# Patient Record
Sex: MALE | Race: WHITE | NOT HISPANIC OR LATINO | URBAN - METROPOLITAN AREA
[De-identification: names, ages, dates, MRNs, and addresses within clinical notes are randomized per-mention and may not be internally consistent; named-entity substitution may affect disease eponyms.]

---

## 2019-03-28 ENCOUNTER — EMERGENCY (EMERGENCY)
Facility: HOSPITAL | Age: 54
LOS: 0 days | Discharge: HOME | End: 2019-03-28
Attending: EMERGENCY MEDICINE | Admitting: EMERGENCY MEDICINE

## 2019-03-28 VITALS
RESPIRATION RATE: 18 BRPM | TEMPERATURE: 97 F | HEART RATE: 70 BPM | OXYGEN SATURATION: 98 % | DIASTOLIC BLOOD PRESSURE: 76 MMHG | SYSTOLIC BLOOD PRESSURE: 133 MMHG

## 2019-03-28 VITALS
DIASTOLIC BLOOD PRESSURE: 76 MMHG | SYSTOLIC BLOOD PRESSURE: 156 MMHG | RESPIRATION RATE: 18 BRPM | TEMPERATURE: 98 F | HEART RATE: 84 BPM | OXYGEN SATURATION: 98 %

## 2019-03-28 DIAGNOSIS — Z79.899 OTHER LONG TERM (CURRENT) DRUG THERAPY: ICD-10-CM

## 2019-03-28 DIAGNOSIS — F17.200 NICOTINE DEPENDENCE, UNSPECIFIED, UNCOMPLICATED: ICD-10-CM

## 2019-03-28 DIAGNOSIS — K57.92 DIVERTICULITIS OF INTESTINE, PART UNSPECIFIED, WITHOUT PERFORATION OR ABSCESS WITHOUT BLEEDING: ICD-10-CM

## 2019-03-28 DIAGNOSIS — R10.32 LEFT LOWER QUADRANT PAIN: ICD-10-CM

## 2019-03-28 LAB
ALBUMIN SERPL ELPH-MCNC: 4.7 G/DL — SIGNIFICANT CHANGE UP (ref 3.5–5.2)
ALP SERPL-CCNC: 55 U/L — SIGNIFICANT CHANGE UP (ref 30–115)
ALT FLD-CCNC: 20 U/L — SIGNIFICANT CHANGE UP (ref 0–41)
ANION GAP SERPL CALC-SCNC: 11 MMOL/L — SIGNIFICANT CHANGE UP (ref 7–14)
APPEARANCE UR: CLEAR — SIGNIFICANT CHANGE UP
AST SERPL-CCNC: 19 U/L — SIGNIFICANT CHANGE UP (ref 0–41)
BASOPHILS # BLD AUTO: 0.04 K/UL — SIGNIFICANT CHANGE UP (ref 0–0.2)
BASOPHILS NFR BLD AUTO: 0.4 % — SIGNIFICANT CHANGE UP (ref 0–1)
BILIRUB SERPL-MCNC: 0.8 MG/DL — SIGNIFICANT CHANGE UP (ref 0.2–1.2)
BILIRUB UR-MCNC: NEGATIVE — SIGNIFICANT CHANGE UP
BUN SERPL-MCNC: 16 MG/DL — SIGNIFICANT CHANGE UP (ref 10–20)
CALCIUM SERPL-MCNC: 9.5 MG/DL — SIGNIFICANT CHANGE UP (ref 8.5–10.1)
CHLORIDE SERPL-SCNC: 100 MMOL/L — SIGNIFICANT CHANGE UP (ref 98–110)
CO2 SERPL-SCNC: 27 MMOL/L — SIGNIFICANT CHANGE UP (ref 17–32)
COLOR SPEC: YELLOW — SIGNIFICANT CHANGE UP
CREAT SERPL-MCNC: 0.9 MG/DL — SIGNIFICANT CHANGE UP (ref 0.7–1.5)
DIFF PNL FLD: NEGATIVE — SIGNIFICANT CHANGE UP
EOSINOPHIL # BLD AUTO: 0.24 K/UL — SIGNIFICANT CHANGE UP (ref 0–0.7)
EOSINOPHIL NFR BLD AUTO: 2.3 % — SIGNIFICANT CHANGE UP (ref 0–8)
GLUCOSE SERPL-MCNC: 95 MG/DL — SIGNIFICANT CHANGE UP (ref 70–99)
GLUCOSE UR QL: NEGATIVE MG/DL — SIGNIFICANT CHANGE UP
HCT VFR BLD CALC: 47.2 % — SIGNIFICANT CHANGE UP (ref 42–52)
HGB BLD-MCNC: 15.5 G/DL — SIGNIFICANT CHANGE UP (ref 14–18)
IMM GRANULOCYTES NFR BLD AUTO: 0.5 % — HIGH (ref 0.1–0.3)
KETONES UR-MCNC: NEGATIVE — SIGNIFICANT CHANGE UP
LACTATE SERPL-SCNC: 0.7 MMOL/L — SIGNIFICANT CHANGE UP (ref 0.5–2.2)
LEUKOCYTE ESTERASE UR-ACNC: NEGATIVE — SIGNIFICANT CHANGE UP
LIDOCAIN IGE QN: 20 U/L — SIGNIFICANT CHANGE UP (ref 7–60)
LYMPHOCYTES # BLD AUTO: 2.17 K/UL — SIGNIFICANT CHANGE UP (ref 1.2–3.4)
LYMPHOCYTES # BLD AUTO: 20.9 % — SIGNIFICANT CHANGE UP (ref 20.5–51.1)
MCHC RBC-ENTMCNC: 30.4 PG — SIGNIFICANT CHANGE UP (ref 27–31)
MCHC RBC-ENTMCNC: 32.8 G/DL — SIGNIFICANT CHANGE UP (ref 32–37)
MCV RBC AUTO: 92.5 FL — SIGNIFICANT CHANGE UP (ref 80–94)
MONOCYTES # BLD AUTO: 0.74 K/UL — HIGH (ref 0.1–0.6)
MONOCYTES NFR BLD AUTO: 7.1 % — SIGNIFICANT CHANGE UP (ref 1.7–9.3)
NEUTROPHILS # BLD AUTO: 7.12 K/UL — HIGH (ref 1.4–6.5)
NEUTROPHILS NFR BLD AUTO: 68.8 % — SIGNIFICANT CHANGE UP (ref 42.2–75.2)
NITRITE UR-MCNC: NEGATIVE — SIGNIFICANT CHANGE UP
NRBC # BLD: 0 /100 WBCS — SIGNIFICANT CHANGE UP (ref 0–0)
PH UR: 6.5 — SIGNIFICANT CHANGE UP (ref 5–8)
PLATELET # BLD AUTO: 194 K/UL — SIGNIFICANT CHANGE UP (ref 130–400)
POTASSIUM SERPL-MCNC: 4.4 MMOL/L — SIGNIFICANT CHANGE UP (ref 3.5–5)
POTASSIUM SERPL-SCNC: 4.4 MMOL/L — SIGNIFICANT CHANGE UP (ref 3.5–5)
PROT SERPL-MCNC: 7.4 G/DL — SIGNIFICANT CHANGE UP (ref 6–8)
PROT UR-MCNC: NEGATIVE MG/DL — SIGNIFICANT CHANGE UP
RBC # BLD: 5.1 M/UL — SIGNIFICANT CHANGE UP (ref 4.7–6.1)
RBC # FLD: 13.3 % — SIGNIFICANT CHANGE UP (ref 11.5–14.5)
SODIUM SERPL-SCNC: 138 MMOL/L — SIGNIFICANT CHANGE UP (ref 135–146)
SP GR SPEC: 1.02 — SIGNIFICANT CHANGE UP (ref 1.01–1.03)
UROBILINOGEN FLD QL: 1 MG/DL (ref 0.2–0.2)
WBC # BLD: 10.36 K/UL — SIGNIFICANT CHANGE UP (ref 4.8–10.8)
WBC # FLD AUTO: 10.36 K/UL — SIGNIFICANT CHANGE UP (ref 4.8–10.8)

## 2019-03-28 RX ORDER — METRONIDAZOLE 500 MG
1 TABLET ORAL
Qty: 21 | Refills: 0
Start: 2019-03-28 | End: 2019-04-03

## 2019-03-28 RX ORDER — CIPROFLOXACIN LACTATE 400MG/40ML
1 VIAL (ML) INTRAVENOUS
Qty: 14 | Refills: 0
Start: 2019-03-28 | End: 2019-04-03

## 2019-03-28 RX ORDER — SODIUM CHLORIDE 9 MG/ML
3 INJECTION INTRAMUSCULAR; INTRAVENOUS; SUBCUTANEOUS ONCE
Qty: 0 | Refills: 0 | Status: COMPLETED | OUTPATIENT
Start: 2019-03-28 | End: 2019-03-28

## 2019-03-28 RX ADMIN — SODIUM CHLORIDE 3 MILLILITER(S): 9 INJECTION INTRAMUSCULAR; INTRAVENOUS; SUBCUTANEOUS at 11:13

## 2019-03-28 NOTE — ED PROVIDER NOTE - NSFOLLOWUPINSTRUCTIONS_ED_ALL_ED_FT
Diverticulitis    Diverticulitis is inflammation or infection of small pouches in your colon that form when you have a condition called diverticulosis. This condition can range from mild to severe potentially leading to perforation or obstructions of your colon. Symptoms include abdominal pain, fever/chills, nausea, vomiting, diarrhea, constipation, or blood in your stool. If you were prescribed an antibiotic medicine, take it as told by your health care provider. Do not stop taking the antibiotic even if you start to feel better.    SEEK IMMEDIATE MEDICAL CARE IF YOU HAVE THE FOLLOWING SYMPTOMS: worsening abdominal pain, high fever, inability to hold down liquids or medication, black or bloody stools, inability to pass gas, lightheadedness/dizziness, or a change in mental status.

## 2019-03-28 NOTE — ED PROVIDER NOTE - PROGRESS NOTE DETAILS
Pt feeling improved, tolerating PO, abdomen soft, non-tender, non-distended, no rebound, no guarding. Patient  aware of all results, given a copy of all results, comfortable with discharge and follow-up outpatient, strict return precautions given. Patient endorses understanding of all of this and aware that they can return at any time for new or concerning symptoms. No further questions or concerns at this time

## 2019-03-28 NOTE — ED PROVIDER NOTE - PHYSICAL EXAMINATION
Con: Well appearing NAD non toxic.   HEENT: NCAT EOMI conjunctiva normal. No nasal discharge. MMM.   Neck: Supple, non tender, full ROM.   CV: RRR no MRG +S1S2.   Pulm: CTA b/l.   Abd: s +LLQ ttp no rebound no guarding ND +BS.   no CVA tenderness b/l   Ext: WWP x4, moving all extremities, no edema.   Psy: Cooperative, appropriate.   Skin: Warm, dry, no rash  Neuro: CN2-12 grossly intact no sensory or motor deficits throughout, no drift, rapid alternating wnl, no ataxia, neg romberg.

## 2019-03-28 NOTE — ED PROVIDER NOTE - CARE PROVIDER_API CALL
Kit Liu)  Gastroenterology; Internal Medicine  [address] [place] [address]  Phone: [phone]  Fax: [phone]  Follow Up Time:

## 2019-03-28 NOTE — ED PROVIDER NOTE - CLINICAL SUMMARY MEDICAL DECISION MAKING FREE TEXT BOX
CT confirms acute diverticulitis.  No perforation or abscess.  D.C with Abx and f/u with PMD/GI.  Tolerating PO and VSS.

## 2019-03-28 NOTE — ED PROVIDER NOTE - OBJECTIVE STATEMENT
52yo M hx diverticulitis otherwise healthy pw LLQ pain x2d- started yesterday morning, sharp, radiating to back intermittently- 3/10 in severity. No f/c/n/v/d, chest pain, shortness of breath, hx abdominal surgeries, dysuria/hematuria, back pain, numbness/focal weakness

## 2019-03-28 NOTE — ED PROVIDER NOTE - ATTENDING CONTRIBUTION TO CARE
54 y/o male with hx of diverticulitis presents to ED with LLQ pain  for 2 days.  Pain is sharp, intermittent, associated with back pain as well.  No dysuria/hematuria.  No F/C/N.  No CP/SOB.  PE:  agree with above.  A/P:  Abdominal Pain.  Labs, CT and Reassess.

## 2019-03-28 NOTE — ED PROVIDER NOTE - NS ED ROS FT
Constitutional:  See HPI.   Eyes:  No visual changes, eye pain or discharge.  ENMT:  No hearing changes, pain, discharge or infections. No neck pain or stiffness.  Cardiac:  No chest pain, SOB or edema. No chest pain with exertion.  Respiratory:  No cough or respiratory distress. No hemoptysis.  GI:  No nausea, vomiting, diarrhea   :  No dysuria, frequency, hematuria  MS:  No joint pain or back pain.  Neuro:  No LOC. No headache or weakness.    Skin:  No skin rash.  Except as in HPI, all other review of systems is negative

## 2019-03-29 LAB
CULTURE RESULTS: SIGNIFICANT CHANGE UP
SPECIMEN SOURCE: SIGNIFICANT CHANGE UP

## 2021-06-30 ENCOUNTER — EMERGENCY (EMERGENCY)
Facility: HOSPITAL | Age: 56
LOS: 0 days | Discharge: HOME | End: 2021-06-30
Attending: EMERGENCY MEDICINE | Admitting: EMERGENCY MEDICINE
Payer: COMMERCIAL

## 2021-06-30 VITALS
HEART RATE: 75 BPM | TEMPERATURE: 98 F | DIASTOLIC BLOOD PRESSURE: 83 MMHG | OXYGEN SATURATION: 99 % | SYSTOLIC BLOOD PRESSURE: 125 MMHG | RESPIRATION RATE: 18 BRPM

## 2021-06-30 DIAGNOSIS — M79.662 PAIN IN LEFT LOWER LEG: ICD-10-CM

## 2021-06-30 DIAGNOSIS — M54.42 LUMBAGO WITH SCIATICA, LEFT SIDE: ICD-10-CM

## 2021-06-30 DIAGNOSIS — F17.200 NICOTINE DEPENDENCE, UNSPECIFIED, UNCOMPLICATED: ICD-10-CM

## 2021-06-30 DIAGNOSIS — R20.0 ANESTHESIA OF SKIN: ICD-10-CM

## 2021-06-30 DIAGNOSIS — Z86.79 PERSONAL HISTORY OF OTHER DISEASES OF THE CIRCULATORY SYSTEM: ICD-10-CM

## 2021-06-30 DIAGNOSIS — Z87.19 PERSONAL HISTORY OF OTHER DISEASES OF THE DIGESTIVE SYSTEM: ICD-10-CM

## 2021-06-30 DIAGNOSIS — I83.92 ASYMPTOMATIC VARICOSE VEINS OF LEFT LOWER EXTREMITY: ICD-10-CM

## 2021-06-30 PROBLEM — K57.92 DIVERTICULITIS OF INTESTINE, PART UNSPECIFIED, WITHOUT PERFORATION OR ABSCESS WITHOUT BLEEDING: Chronic | Status: ACTIVE | Noted: 2019-03-28

## 2021-06-30 LAB
ALBUMIN SERPL ELPH-MCNC: 4.8 G/DL — SIGNIFICANT CHANGE UP (ref 3.5–5.2)
ALP SERPL-CCNC: 66 U/L — SIGNIFICANT CHANGE UP (ref 30–115)
ALT FLD-CCNC: 30 U/L — SIGNIFICANT CHANGE UP (ref 0–41)
ANION GAP SERPL CALC-SCNC: 9 MMOL/L — SIGNIFICANT CHANGE UP (ref 7–14)
AST SERPL-CCNC: 26 U/L — SIGNIFICANT CHANGE UP (ref 0–41)
BASOPHILS # BLD AUTO: 0.06 K/UL — SIGNIFICANT CHANGE UP (ref 0–0.2)
BASOPHILS NFR BLD AUTO: 0.5 % — SIGNIFICANT CHANGE UP (ref 0–1)
BILIRUB SERPL-MCNC: 0.6 MG/DL — SIGNIFICANT CHANGE UP (ref 0.2–1.2)
BUN SERPL-MCNC: 21 MG/DL — HIGH (ref 10–20)
CALCIUM SERPL-MCNC: 9.6 MG/DL — SIGNIFICANT CHANGE UP (ref 8.5–10.1)
CHLORIDE SERPL-SCNC: 102 MMOL/L — SIGNIFICANT CHANGE UP (ref 98–110)
CO2 SERPL-SCNC: 29 MMOL/L — SIGNIFICANT CHANGE UP (ref 17–32)
CREAT SERPL-MCNC: 0.9 MG/DL — SIGNIFICANT CHANGE UP (ref 0.7–1.5)
EOSINOPHIL # BLD AUTO: 0.38 K/UL — SIGNIFICANT CHANGE UP (ref 0–0.7)
EOSINOPHIL NFR BLD AUTO: 3.3 % — SIGNIFICANT CHANGE UP (ref 0–8)
GLUCOSE SERPL-MCNC: 94 MG/DL — SIGNIFICANT CHANGE UP (ref 70–99)
HCT VFR BLD CALC: 46 % — SIGNIFICANT CHANGE UP (ref 42–52)
HGB BLD-MCNC: 15.7 G/DL — SIGNIFICANT CHANGE UP (ref 14–18)
IMM GRANULOCYTES NFR BLD AUTO: 1 % — HIGH (ref 0.1–0.3)
LYMPHOCYTES # BLD AUTO: 2.8 K/UL — SIGNIFICANT CHANGE UP (ref 1.2–3.4)
LYMPHOCYTES # BLD AUTO: 24.1 % — SIGNIFICANT CHANGE UP (ref 20.5–51.1)
MAGNESIUM SERPL-MCNC: 2.1 MG/DL — SIGNIFICANT CHANGE UP (ref 1.8–2.4)
MCHC RBC-ENTMCNC: 30.7 PG — SIGNIFICANT CHANGE UP (ref 27–31)
MCHC RBC-ENTMCNC: 34.1 G/DL — SIGNIFICANT CHANGE UP (ref 32–37)
MCV RBC AUTO: 89.8 FL — SIGNIFICANT CHANGE UP (ref 80–94)
MONOCYTES # BLD AUTO: 0.85 K/UL — HIGH (ref 0.1–0.6)
MONOCYTES NFR BLD AUTO: 7.3 % — SIGNIFICANT CHANGE UP (ref 1.7–9.3)
NEUTROPHILS # BLD AUTO: 7.41 K/UL — HIGH (ref 1.4–6.5)
NEUTROPHILS NFR BLD AUTO: 63.8 % — SIGNIFICANT CHANGE UP (ref 42.2–75.2)
NRBC # BLD: 0 /100 WBCS — SIGNIFICANT CHANGE UP (ref 0–0)
PLATELET # BLD AUTO: 224 K/UL — SIGNIFICANT CHANGE UP (ref 130–400)
POTASSIUM SERPL-MCNC: 5 MMOL/L — SIGNIFICANT CHANGE UP (ref 3.5–5)
POTASSIUM SERPL-SCNC: 5 MMOL/L — SIGNIFICANT CHANGE UP (ref 3.5–5)
PROT SERPL-MCNC: 7.5 G/DL — SIGNIFICANT CHANGE UP (ref 6–8)
RBC # BLD: 5.12 M/UL — SIGNIFICANT CHANGE UP (ref 4.7–6.1)
RBC # FLD: 13.9 % — SIGNIFICANT CHANGE UP (ref 11.5–14.5)
SODIUM SERPL-SCNC: 140 MMOL/L — SIGNIFICANT CHANGE UP (ref 135–146)
WBC # BLD: 11.62 K/UL — HIGH (ref 4.8–10.8)
WBC # FLD AUTO: 11.62 K/UL — HIGH (ref 4.8–10.8)

## 2021-06-30 PROCEDURE — 99285 EMERGENCY DEPT VISIT HI MDM: CPT

## 2021-06-30 PROCEDURE — 93970 EXTREMITY STUDY: CPT | Mod: 26

## 2021-06-30 RX ORDER — IBUPROFEN 200 MG
800 TABLET ORAL ONCE
Refills: 0 | Status: COMPLETED | OUTPATIENT
Start: 2021-06-30 | End: 2021-06-30

## 2021-06-30 RX ADMIN — Medication 800 MILLIGRAM(S): at 12:39

## 2021-06-30 NOTE — ED PROVIDER NOTE - NSFOLLOWUPINSTRUCTIONS_ED_ALL_ED_FT
Sciatica    WHAT YOU NEED TO KNOW:    Sciatica is a condition that causes pain along your sciatic nerve. The sciatic nerve runs from your spine through both sides of your buttocks. It then runs down the back of your thigh, into your lower leg and foot. Your sciatic nerve may be compressed, inflamed, irritated, or stretched.    DISCHARGE INSTRUCTIONS:    Medicines:   •NSAIDs: These medicines decrease swelling and pain. NSAIDs are available without a doctor's order. Ask your healthcare provider which medicine is right for you. Ask how much to take and when to take it. Take as directed. NSAIDs can cause stomach bleeding or kidney problems if not taken correctly.      •Acetaminophen: This medicine decreases pain. Acetaminophen is available without a doctor's order. Ask how much to take and when to take it. Follow directions. Acetaminophen can cause liver damage if not taken correctly.      •Muscle relaxers help decrease pain and muscle spasms.      •Take your medicine as directed. Contact your healthcare provider if you think your medicine is not helping or if you have side effects. Tell him of her if you are allergic to any medicine. Keep a list of the medicines, vitamins, and herbs you take. Include the amounts, and when and why you take them. Bring the list or the pill bottles to follow-up visits. Carry your medicine list with you in case of an emergency.      Follow up with your healthcare provider as directed: Write down your questions so you remember to ask them during your visits.     Manage your symptoms:   •Activity: Decrease your activity. Do not lift heavy objects or twist your back for at least 6 weeks. Slowly return to your usual activity.      •Ice: Ice helps decrease swelling and pain. Ice may also help prevent tissue damage. Use an ice pack, or put crushed ice in a plastic bag. Cover it with a towel and place it on your low back or leg for 15 to 20 minutes every hour or as directed.      •Heat: Heat helps decrease pain and muscle spasms. Apply heat on the area for 20 to 30 minutes every 2 hours for as many days as directed.       •Physical therapy: You may need to see physical therapist to teach you exercises to help improve movement and strength, and to decrease pain. An occupational therapist teaches you skills to help with your daily activities.       •Use assistive devices if directed: You may need to wear back support, such as a back brace. You may need crutches, a cane, or a walker to decrease stress on your lower back and leg muscles. Ask your healthcare provider for more information about assistive devices and how to use them correctly.      Self-care:   •Avoid pressure on your back and legs: Do not lift heavy objects, or stand or sit for long periods of time.      •Lift objects safely: Keep your back straight and bend your knees when you  an object. Do not bend or twist your back when you lift.      •Maintain a healthy weight: Ask your healthcare provider how much you should weigh. Ask him to help you create a weight loss plan if you are overweight.       •Exercise: Ask your healthcare provider about the best stretching, warmup, and exercise plan for you.       Contact your healthcare provider if:   •You have pain in your lower back at night or when resting.      •You have pain in your lower back with numbness below the knee.      •You have weakness in one leg only.      •You have questions or concerns about your condition or care.      Return to the emergency department if:   •You have trouble holding back your urine or bowel movements.      •You have weakness in both legs.      •You have numbness in your groin or buttocks.         © Copyright [employer] 2021           back to top                          © Copyright [employer] 2021

## 2021-06-30 NOTE — ED PROVIDER NOTE - CLINICAL SUMMARY MEDICAL DECISION MAKING FREE TEXT BOX
56yoM with h/o sciatica, varicose veins, presents with tightness/pressure feeling to L calf x 2 days. Initially had hip pain radiating down to thigh with thigh pressure, this resolved to the calf, also having numbness to the lateral leg. Also had some cramping to that leg last week. Onset prior to plane travel. Referred to neuro and sent here from there for DVT US r/o and will f/u with neuro afterward. Denies CP, SOB, back pain, urinary symptoms, v/d, h/o IVDU, and all other symptoms. On exam, afebrile, hemodynamically stable, saturating well, NAD, well appearing, head NCAT, no CTL spine TTP/stepoff/deformity, EOMI grossly, anicteric, MMM, breathing comfortably on RA, abd soft, NT, ND, nml BS, no rebound or guarding, no CVAT, AAO, CN's 3-12 grossly intact, STILES spontaneously, no leg cyanosis or edema, skin warm, well perfused, no rashes or hives, noted varicose veins to left leg, intact sensation to foot and thigh, motor 5/5 in b/l LE's, 2+ symm DP pulses. Character low suspicion for dissection and no pulse asymmetry. No e/o vertebral pathology, and low suspicion by hx for cord compression and no weakness. No e/o cellulitis or PAD, compartment syndrome. Appearance and hx low suspicion for DVT and US negative. Electrolytes unremarkable. Patient is well appearing, NAD, afebrile, hemodynamically stable. Any available tests and studies were discussed with patient. Discharged with instructions in further symptomatic care, return precautions, and need for neuro f/u.

## 2021-06-30 NOTE — ED PROVIDER NOTE - NSFOLLOWUPCLINICS_GEN_ALL_ED_FT
[facility]  Neurology  [address] [place] [address], [facility] [address]  [place] [address]  Phone: [phone]  Fax:   Follow Up Time: 1-3 Days

## 2021-06-30 NOTE — ED PROVIDER NOTE - ATTENDING CONTRIBUTION TO CARE
56yoM with h/o sciatica, varicose veins, presents with tightness/pressure feeling to L calf x 2 days. Initially had hip pain radiating down to thigh with thigh pressure, this resolved to the calf, also having numbness to the lateral leg. Also had some cramping to that leg last week. Onset prior to plane travel. Referred to neuro and sent here from there for DVT US r/o and will f/u with neuro afterward. Denies CP, SOB, back pain, urinary symptoms, v/d, h/o IVDU, and all other symptoms. On exam, afebrile, hemodynamically stable, saturating well, NAD, well appearing, head NCAT, no CTL spine TTP/stepoff/deformity, EOMI grossly, anicteric, MMM, breathing comfortably on RA, abd soft, NT, ND, nml BS, no rebound or guarding, no CVAT, AAO, CN's 3-12 grossly intact, STILES spontaneously, no leg cyanosis or edema, skin warm, well perfused, no rashes or hives, noted varicose veins to left leg, intact sensation to foot and thigh, motor 5/5 in b/l LE's, 2+ symm DP pulses. Character low suspicion for dissection and no pulse asymmetry. No e/o vertebral pathology, and low suspicion by hx for cord compression and no weakness. No e/o cellulitis or PAD, compartment syndrome. Appearance and hx low suspicion for DVT and US __________. 56yoM with h/o sciatica, varicose veins, presents with tightness/pressure feeling to L calf x 2 days. Initially had hip pain radiating down to thigh with thigh pressure, this resolved to the calf, also having numbness to the lateral leg. Also had some cramping to that leg last week. Onset prior to plane travel. Referred to neuro and sent here from there for DVT US r/o and will f/u with neuro afterward. Denies CP, SOB, back pain, urinary symptoms, v/d, h/o IVDU, and all other symptoms. On exam, afebrile, hemodynamically stable, saturating well, NAD, well appearing, head NCAT, no CTL spine TTP/stepoff/deformity, EOMI grossly, anicteric, MMM, breathing comfortably on RA, abd soft, NT, ND, nml BS, no rebound or guarding, no CVAT, AAO, CN's 3-12 grossly intact, STILES spontaneously, no leg cyanosis or edema, skin warm, well perfused, no rashes or hives, noted varicose veins to left leg, intact sensation to foot and thigh, motor 5/5 in b/l LE's, 2+ symm DP pulses. Character low suspicion for dissection and no pulse asymmetry. No e/o vertebral pathology, and low suspicion by hx for cord compression and no weakness. No e/o cellulitis or PAD, compartment syndrome. Appearance and hx low suspicion for DVT and US negative. Electrolytes unremarkable. Patient is well appearing, NAD, afebrile, hemodynamically stable. Any available tests and studies were discussed with patient. Discharged with instructions in further symptomatic care, return precautions, and need for neuro f/u.

## 2021-06-30 NOTE — ED PROVIDER NOTE - PATIENT PORTAL LINK FT
You can access the FollowMyHealth Patient Portal offered by [facility] by registering at the following website: [email or web address]. By joining [email or web address] FollowMyHealth portal, you will also be able to view your health information using other applications (apps) compatible with our system.

## 2021-06-30 NOTE — ED PROVIDER NOTE - CARDIAC, MLM
Normal rate, regular rhythm.  Heart sounds S1, S2.  No murmurs, rubs or gallops.  No lower extremity tenderness/edema.  Pedal pulses 2+ bilaterally.

## 2021-06-30 NOTE — ED PROVIDER NOTE - MUSCULOSKELETAL, MLM
Spine appears normal, range of motion is not limited, no midline tenderness, (+) Left lower lumbar tenderness.

## 2021-06-30 NOTE — ED PROVIDER NOTE - OBJECTIVE STATEMENT
56 y.o. male with a PMH of sciatica presented to the ER 56 y.o. male with a PMH of sciatica presented to the ER c/o Left lower leg pain for the past 1-2 weeks.  Pt seen and tx by his PCP and given muscle relaxers w/o relief.  Pt just returned form [place] but states that the pain started before the trip.  Denies fever, chills, abdominal pain, flank pain, chest pain, SOB, extremity swelling, extremity weakness/paresthesias, bladder/bowel incontinence, saddle numbness, dysuria, hematuria, ataxia. Pt referred to ER by ortho for r/o DVT.  No other complaints.

## 2022-10-29 ENCOUNTER — EMERGENCY (EMERGENCY)
Facility: HOSPITAL | Age: 57
LOS: 0 days | Discharge: HOME | End: 2022-10-29
Attending: EMERGENCY MEDICINE | Admitting: EMERGENCY MEDICINE

## 2022-10-29 VITALS
HEIGHT: 66 IN | RESPIRATION RATE: 16 BRPM | TEMPERATURE: 99 F | OXYGEN SATURATION: 98 % | SYSTOLIC BLOOD PRESSURE: 148 MMHG | WEIGHT: 160.06 LBS | HEART RATE: 92 BPM | DIASTOLIC BLOOD PRESSURE: 74 MMHG

## 2022-10-29 DIAGNOSIS — Z88.0 ALLERGY STATUS TO PENICILLIN: ICD-10-CM

## 2022-10-29 DIAGNOSIS — K40.90 UNILATERAL INGUINAL HERNIA, WITHOUT OBSTRUCTION OR GANGRENE, NOT SPECIFIED AS RECURRENT: ICD-10-CM

## 2022-10-29 DIAGNOSIS — R10.9 UNSPECIFIED ABDOMINAL PAIN: ICD-10-CM

## 2022-10-29 PROCEDURE — 99283 EMERGENCY DEPT VISIT LOW MDM: CPT

## 2022-10-29 NOTE — ED ADULT NURSE NOTE - OBJECTIVE STATEMENT
Presents in ED c/o RLQ abdominal discomfort. States went to [facility] and was sent here for unguinal hernia. Denies N,V,D. having bowel movements normally. States had cough 2 months ago and since that time have that problem.

## 2022-10-29 NOTE — ED PROVIDER NOTE - CARE PROVIDER_API CALL
WENDI BELL  General Surgery  [address] [place] [address]  Phone: [phone]  Fax: [phone]  Follow Up Time: 1-3 Days    Dustin Farr)  Surgery  [address] [place] [address], [address] [place] [address]  Phone: [phone]  Fax: [phone]  Follow Up Time: 1-3 Days    VADIM ABAD  Internal Medicine  [address] [place] [address]  Phone: ([phone]  Fax: ()-  Follow Up Time: Routine

## 2022-10-29 NOTE — ED PROVIDER NOTE - CARE PROVIDERS DIRECT ADDRESSES
,DirectAddress_Unknown,[email or web address],DirectAddress_Unknown

## 2022-10-29 NOTE — ED PROVIDER NOTE - NS ED ATTENDING STATEMENT MOD
This was a shared visit with the TETO. I reviewed and verified the documentation and independently performed the documented:

## 2022-10-29 NOTE — ED PROVIDER NOTE - ATTENDING APP SHARED VISIT CONTRIBUTION OF CARE
58 yo m with no pmh presents with R inguinal hernia.  pt says noted 1 month ago.  went to [facility] care yesterday for eval and was advised to go to ED for eval.  denies pain and has been reducible.  no fever, chills, n/v/d, abd pain.  normal BM and passing gas.  exam: nad, ncat, perrl, eomi, mmm, rrr, ctab, abd soft, nt, nd aox3, R inguinal hernia, nontender, no skin changes, easily reducible imp: pt with R inguinal hernia, no concerning signs, pt to f/u with surg outpt

## 2022-10-29 NOTE — ED PROVIDER NOTE - PHYSICAL EXAMINATION
CONSTITUTIONAL: Well-developed; well-nourished; in no acute distress, nontoxic appearing  SKIN: skin exam is warm and dry  HEAD: Normocephalic; atraumatic  EYES: PERRL, 3mm bilaterally, conjunctiva and sclera clear.  ENT: MMM  ABD: soft; non-distended; non-tender. No Rebound, No guarding  EXT: Normal ROM.   NEURO: awake, alert, following commands, oriented, grossly unremarkable. No Focal deficits. GCS 15.   PSYCH: Cooperative, appropriate.

## 2022-10-29 NOTE — ED PROVIDER NOTE - PATIENT PORTAL LINK FT
You can access the FollowMyHealth Patient Portal offered by [facility] by registering at the following website: [email or web address]. By joining [employer]’s FollowMyHealth portal, you will also be able to view your health information using other applications (apps) compatible with our system.

## 2022-10-29 NOTE — ED PROVIDER NOTE - OBJECTIVE STATEMENT
57 year old male, no past medical history, who presents with hernia. patient with right inguinal hernia x1 month, reducible, with intermittent pain since. patient evaluated at urgent care yesterday, recommended ed eval. patient without pain to area, +reducible. no fever, chills, nausea/vomiting/diarrhea/constipation, skin changes.

## 2022-10-29 NOTE — ED PROVIDER NOTE - NSFOLLOWUPINSTRUCTIONS_ED_ALL_ED_FT
Please follow up with your primary care doctor and general surgery in 1-2 weeks.   Please be aware of any new or worsening signs or symptoms that should prompt your return to the ER.      Our Emergency Department Referral Coordinators will be reaching out to you in the next 24-48 hours from 9:00am to 5:00pm (Monday - Friday) with a follow up appointment for general surgery. Please expect a phone call from the hospital in that time frame. If you do not receive a call or if you have any questions or concerns, you can reach them at [phone] or [phone].      Inguinal Hernia    WHAT YOU NEED TO KNOW:    An inguinal hernia happens when organs or abdominal tissue push through a weak spot in the abdominal wall. The abdominal wall is made of fat and muscle. It holds the intestines in place. The hernia may contain fluid, tissue from the abdomen, or part of an organ (such as an intestine).Inguinal Hernia         DISCHARGE INSTRUCTIONS:    Return to the emergency department if:     You have severe abdominal pain with nausea and vomiting.       Your abdomen is larger than usual.       Your hernia gets bigger or is purple or blue.       You see blood in your bowel movements.      You feel weak, dizzy, or faint.     Contact your healthcare provider if:     You have a fever.      You have questions or concerns about your condition or care.    Medicine: You may need the following:     NSAIDs, such as ibuprofen, help decrease swelling, pain, and fever. NSAIDs can cause stomach bleeding or kidney problems in certain people. If you take blood thinner medicine, always ask your healthcare provider if NSAIDs are safe for you. Always read the medicine label and follow directions.      Take your medicine as directed. Contact your healthcare provider if you think your medicine is not helping or if you have side effects. Tell him or her if you are allergic to any medicine. Keep a list of the medicines, vitamins, and herbs you take. Include the amounts, and when and why you take them. Bring the list or the pill bottles to follow-up visits. Carry your medicine list with you in case of an emergency.    Follow up with your healthcare provider as directed: Write down your questions so you remember to ask them during your visits.    Manage your symptoms and prevent another hernia:     Do not lift anything heavy. Heavy lifting can make your hernia worse or cause another hernia. Ask your healthcare provider how much is safe for you to lift.       Drink liquids as directed. Liquids may prevent constipation and straining during a bowel movement. Ask how much liquid to drink each day and which liquids are best for you.       Eat foods high in fiber. Fiber may prevent constipation and straining during a bowel movement. Foods that contain fiber include fruits, vegetables, beans, lentils, and whole grains.       Maintain a healthy weight. If you are overweight, weight loss may prevent your hernia from getting worse. It may also prevent another hernia. Talk to your healthcare provider about exercise and how to lose weight safely if you are overweight.       Do not smoke. Nicotine and other chemicals in cigarettes and cigars can weaken the abdominal wall. This may increase your risk for another hernia. Ask your healthcare provider for information if you currently smoke and need help to quit. E-cigarettes or smokeless tobacco still contain nicotine. Talk to your healthcare provider before you use these products.

## 2022-10-29 NOTE — ED PROVIDER NOTE - PROVIDER TOKENS
PROVIDER:[TOKEN:[[id number]],FOLLOWUP:[1-3 Days]],PROVIDER:[TOKEN:[[id number]],FOLLOWUP:[1-3 Days]],PROVIDER:[TOKEN:[[id number]],FOLLOWUP:[Routine]]

## 2022-10-29 NOTE — ED ADULT TRIAGE NOTE - AS TEMP SITE
Chief complaint:   Chief Complaint   Patient presents with   â¢ Diabetes   â¢ Blood Pressure       Vitals:  Visit Vitals  /66   Pulse 68   Wt 69.7 kg   BMI 26.38 kg/mÂ²       HISTORY OF PRESENT ILLNESS     HPI chart maybe incomplete because epic is down and moving is difficult. The main thing is to see the patient. Therefore Medicare wellness is not done. He is doing well. Up and about uses the cane. In stores for convenience he will try and use the motorized chair. Gets about the house without problem not falling. Since the stroke low swelling in his feet but his weight is the same as it was September of last year. There is no dyspnea on exertion paroxysmal nocturnal dyspnea no chest pain. Diet is healthy active around the house no formal exercise. Cares for eyes and teeth.     Other significant problems:  Patient Active Problem List    Diagnosis Date Noted   â¢ Urge incontinence of urine 08/09/2016     Priority: Low   â¢ Pre-op exam 08/09/2016     Priority: Low   â¢ Medicare annual wellness visit, initial 08/18/2015     Priority: Low   â¢ Contusion of coccyx 08/18/2015     Priority: Low   [facility] discharge follow-up 06/12/2015     Priority: Low   â¢ BPH (benign prostatic hyperplasia)      Priority: Low   â¢ Conjunctival hemorrhage 01/05/2015     Priority: Low   â¢ Gross hematuria 03/31/2014     Priority: Low   â¢ TIA (transient ischemic attack) 12/01/2013     Priority: Low   â¢ Diabetes mellitus, type II (CMS/[facility]) 12/01/2013     Priority: Low   â¢ Hypertension 12/01/2013     Priority: Low       PAST MEDICAL, FAMILY AND SOCIAL HISTORY     Medications:  Current Outpatient Medications   Medication   â¢ glipiZIDE (GLUCOTROL) 2.5 MG 24 hr tablet   â¢ metFORMIN (GLUCOPHAGE) 500 MG tablet   â¢ gabapentin (NEURONTIN) 300 MG capsule   â¢ hydrochlorothiazide (HYDRODIURIL) 25 MG tablet   â¢ atorvastatin (LIPITOR) 80 MG tablet   â¢ losartan (COZAAR) 25 MG tablet   â¢ sulfacetamide (BLEPH-10) 10 % ophthalmic solution â¢ potassium chloride (KLOR-CON, K-TAB) 10 MEQ ER tablet   â¢ aspirin 81 MG chewable tablet     No current facility-administered medications for this visit. Allergies:  ALLERGIES:  No Known Allergies    Past Medical  History/Surgeries:  Past Medical History:   Diagnosis Date   â¢ BPH (benign prostatic hyperplasia)    â¢ CVA (cerebral vascular accident) (CMS/[facility]) 06/05/2017    good recovery,back to baseline weakness of the previous cva,independent   â¢ Diabetes mellitus, type II (CMS/[facility]) 12/2013   â¢ Fracture of fifth metacarpal bone 09/23/2016    Base fracture   â¢ Hypertension 12/2013   â¢ TIA (transient ischemic attack) 12/2013       Past Surgical History:   Procedure Laterality Date   â¢ Cataract extrac w/ intraocular lens imp&ant vit,bilaterl Bilateral 08/2019   â¢ Removal of tonsils,<15 y/o     â¢ Transurethral elec-surg prostatectom  6/5/2014   â¢ Transurethral elec-surg prostatectom  6/6/2015       Family History:  Family History   Problem Relation Age of Onset   â¢ Diabetes Mother         Passed away from [facility] 2007   â¢ Heart Father         Passed away from [facility] in 2001   â¢ Diabetes Sister         Passed away unknown in 2017   â¢ Clotting Disorder Brother         Passed away from a blood clot June 31, 2019       Social History:  Social History     Tobacco Use   â¢ Smoking status: Current Every Day Smoker     Packs/day: 0.30     Years: 20.00     Pack years: 6.00     Types: Cigarettes   â¢ Smokeless tobacco: Never Used   â¢ Tobacco comment: started e cig 8/2017.4/2019 packq 3 days. working on cutting(7/2019)   Substance Use Topics   â¢ Alcohol use: Not Currently     Alcohol/week: 0.0 standard drinks       REVIEW OF SYSTEMS     Physical exam no chills no fever. Gait is good using his cane very steady. Get up and go is good. Tympanic membranes normal.  KITTY EOM intact. Pharynx clear membranes moist of good color. Neck is supple no bruit or adenopathy.   Chest clear to auscultation heart regular no murmur S3 or S4. Abdomen soft no mass or tenderness. Extremities minimal a doughy a feel to the lower extremities vibration is of felt. Range of motion is good. Skin warm and dry    PHYSICAL EXAM     No diplopia scotoma or blurring. No dysphagia or hoarseness. Occasional cough. For cardiovascular see above. No nausea vomiting or diarrhea. No dysuria or hematuria. Uses the cane is mention. No skin rash    ASSESSMENT/PLAN     We reviewed his labs cholesterol excellent com see for the diabetes excellent control. Medicines working very well. Blood pressure controlled. Ambulating well with his cane post stroke. There is no pathologic edema. Does not like to do influenza nor shingles vaccine. Peterson Mendoza was seen today for diabetes and blood pressure. Diagnoses and all orders for this visit:    Diabetic retinopathy screening    Type 2 diabetes mellitus with diabetic neuropathy, without long-term current use of insulin (CMS/[facility])  -     glipiZIDE (GLUCOTROL) 2.5 MG 24 hr tablet; Take 1 tablet by mouth daily. -     metFORMIN (GLUCOPHAGE) 500 MG tablet; Take 1 tablet by mouth 2 times daily (with meals). Transient cerebral ischemia, unspecified type  -     glipiZIDE (GLUCOTROL) 2.5 MG 24 hr tablet; Take 1 tablet by mouth daily. Essential hypertension    Hypercholesterolemia    Other orders  -     gabapentin (NEURONTIN) 300 MG capsule; Take 1 capsule by mouth 2 times daily. -     hydrochlorothiazide (HYDRODIURIL) 25 MG tablet; Take 1 tablet by mouth daily. -     atorvastatin (LIPITOR) 80 MG tablet; Take 1 tablet by mouth nightly. oral

## 2022-11-09 PROBLEM — Z00.00 ENCOUNTER FOR PREVENTIVE HEALTH EXAMINATION: Status: ACTIVE | Noted: 2022-11-09

## 2022-11-12 ENCOUNTER — TRANSCRIPTION ENCOUNTER (OUTPATIENT)
Age: 57
End: 2022-11-12

## 2022-11-14 ENCOUNTER — APPOINTMENT (OUTPATIENT)
Dept: INTERNAL MEDICINE | Facility: CLINIC | Age: 57
End: 2022-11-14

## 2022-11-14 ENCOUNTER — OUTPATIENT (OUTPATIENT)
Dept: OUTPATIENT SERVICES | Facility: HOSPITAL | Age: 57
LOS: 1 days | Discharge: HOME | End: 2022-11-14

## 2022-11-14 VITALS
HEIGHT: 67 IN | OXYGEN SATURATION: 96 % | DIASTOLIC BLOOD PRESSURE: 82 MMHG | HEART RATE: 67 BPM | SYSTOLIC BLOOD PRESSURE: 135 MMHG | BODY MASS INDEX: 23.6 KG/M2 | WEIGHT: 150.38 LBS | TEMPERATURE: 98.6 F

## 2022-11-14 DIAGNOSIS — K40.90 UNILATERAL INGUINAL HERNIA, WITHOUT OBSTRUCTION OR GANGRENE, NOT SPECIFIED AS RECURRENT: ICD-10-CM

## 2022-11-14 NOTE — HEALTH RISK ASSESSMENT
[Current] : Current [20 or more] : 20 or more [Yes] : Yes [2 - 4 times a month (2 pts)] : 2-4 times a month (2 points) [No] : In the past 12 months have you used drugs other than those required for medical reasons? No [No falls in past year] : Patient reported no falls in the past year [0] : 2) Feeling down, depressed, or hopeless: Not at all (0) [PHQ-2 Negative - No further assessment needed] : PHQ-2 Negative - No further assessment needed

## 2022-11-14 NOTE — HISTORY OF PRESENT ILLNESS
[Spouse] : spouse [FreeTextEntry8] : 57 yr old male seen for the first time in the clinin.\par Noticed rt groin swelling 3 months ago which started to grow bigger. Seen in the ER few days ago for pain but now has no pain. Works as a [employer] and lifts heavy material. no fever nausea or vomiting. No hx of any Chronic problems. Hx of Divertculitis and treated in the Hosptal many years ago

## 2022-11-14 NOTE — PHYSICAL EXAM
[Declined Rectal Exam] : declined rectal exam [Normal] : affect was normal and insight and judgment were intact [de-identified] : Rt Inguinal Hernia

## 2022-11-18 ENCOUNTER — NON-APPOINTMENT (OUTPATIENT)
Age: 57
End: 2022-11-18

## 2022-11-18 LAB
ALBUMIN SERPL ELPH-MCNC: 5.1 G/DL
ALP BLD-CCNC: 67 U/L
ALT SERPL-CCNC: 28 U/L
ANION GAP SERPL CALC-SCNC: 15 MMOL/L
APPEARANCE: CLEAR
AST SERPL-CCNC: 27 U/L
BASOPHILS # BLD AUTO: 0.07 K/UL
BASOPHILS NFR BLD AUTO: 0.5 %
BILIRUB SERPL-MCNC: 0.5 MG/DL
BILIRUBIN URINE: NEGATIVE
BLOOD URINE: NEGATIVE
BUN SERPL-MCNC: 15 MG/DL
CALCIUM SERPL-MCNC: 10 MG/DL
CHLORIDE SERPL-SCNC: 102 MMOL/L
CHOLEST SERPL-MCNC: 227 MG/DL
CO2 SERPL-SCNC: 26 MMOL/L
COLOR: YELLOW
CREAT SERPL-MCNC: 0.8 MG/DL
EGFR: 105 ML/MIN/1.73M2
EOSINOPHIL # BLD AUTO: 0.28 K/UL
EOSINOPHIL NFR BLD AUTO: 2.2 %
GLUCOSE QUALITATIVE U: NEGATIVE
GLUCOSE SERPL-MCNC: 100 MG/DL
HCT VFR BLD CALC: 47.4 %
HDLC SERPL-MCNC: 61 MG/DL
HGB BLD-MCNC: 16.4 G/DL
IMM GRANULOCYTES NFR BLD AUTO: 0.4 %
KETONES URINE: NEGATIVE
LDLC SERPL CALC-MCNC: 153 MG/DL
LEUKOCYTE ESTERASE URINE: NEGATIVE
LYMPHOCYTES # BLD AUTO: 2.16 K/UL
LYMPHOCYTES NFR BLD AUTO: 16.7 %
MAN DIFF?: NORMAL
MCHC RBC-ENTMCNC: 31.8 PG
MCHC RBC-ENTMCNC: 34.6 G/DL
MCV RBC AUTO: 91.9 FL
MONOCYTES # BLD AUTO: 0.83 K/UL
MONOCYTES NFR BLD AUTO: 6.4 %
NEUTROPHILS # BLD AUTO: 9.53 K/UL
NEUTROPHILS NFR BLD AUTO: 73.8 %
NITRITE URINE: NEGATIVE
NONHDLC SERPL-MCNC: 166 MG/DL
PH URINE: 7
PLATELET # BLD AUTO: 228 K/UL
POTASSIUM SERPL-SCNC: 4.8 MMOL/L
PROT SERPL-MCNC: 7.3 G/DL
PROTEIN URINE: NORMAL
PSA SERPL-MCNC: 3.55 NG/ML
RBC # BLD: 5.16 M/UL
RBC # FLD: 13.4 %
SODIUM SERPL-SCNC: 143 MMOL/L
SPECIFIC GRAVITY URINE: 1.02
TRIGL SERPL-MCNC: 64 MG/DL
TSH SERPL-ACNC: 2.07 UIU/ML
UROBILINOGEN URINE: NORMAL
WBC # FLD AUTO: 12.92 K/UL

## 2022-11-23 ENCOUNTER — APPOINTMENT (OUTPATIENT)
Dept: SURGERY | Facility: CLINIC | Age: 57
End: 2022-11-23

## 2022-11-23 ENCOUNTER — OUTPATIENT (OUTPATIENT)
Dept: OUTPATIENT SERVICES | Facility: HOSPITAL | Age: 57
LOS: 1 days | Discharge: HOME | End: 2022-11-23

## 2022-11-23 VITALS
SYSTOLIC BLOOD PRESSURE: 108 MMHG | OXYGEN SATURATION: 97 % | DIASTOLIC BLOOD PRESSURE: 62 MMHG | TEMPERATURE: 97.2 F | HEART RATE: 90 BPM | HEIGHT: 67 IN

## 2022-11-23 DIAGNOSIS — K57.32 DIVERTICULITIS OF LARGE INTESTINE WITHOUT PERFORATION OR ABSCESS WITHOUT BLEEDING: ICD-10-CM

## 2022-11-23 DIAGNOSIS — K40.90 UNILATERAL INGUINAL HERNIA, WITHOUT OBSTRUCTION OR GANGRENE, NOT SPECIFIED AS RECURRENT: ICD-10-CM

## 2022-11-23 DIAGNOSIS — K57.32 DIVERTICULITIS OF LARGE INTESTINE W/OUT PERFORATION OR ABSCESS W/OUT BLEEDING: ICD-10-CM

## 2022-11-23 PROCEDURE — 99204 OFFICE O/P NEW MOD 45 MIN: CPT

## 2022-11-23 PROCEDURE — 99406 BEHAV CHNG SMOKING 3-10 MIN: CPT

## 2022-11-23 PROCEDURE — 74176 CT ABD & PELVIS W/O CONTRAST: CPT | Mod: 26

## 2022-11-23 NOTE — PHYSICAL EXAM
[Purpura] : no purpura  [Alert] : alert [Calm] : calm [de-identified] : Normal [de-identified] : Normal\par right groin swelling - reducible

## 2022-11-23 NOTE — HISTORY OF PRESENT ILLNESS
[de-identified] : Mr. ERYN ARAGON is a 57 year  year old man. He presents to the office on 11/23/2022 , his primary is Doesnt have PCP .\par \par smoker  1pack a day  \par carpeneter\par on light duty \par right groin pain and swelling\par h/o divertiuclits\par wcb 12 \par ua clean \par \par

## 2022-11-23 NOTE — ASSESSMENT
[FreeTextEntry1] : Mr. ERYN ARAGON is a 57 year  year old man. He presents to the office on 11/23/2022 , his primary is Doesnt have PCP .\par \par smoker  1pack a day  \par carpeneter\par on light duty \par right groin pain and swelling\par h/o divertiuclits\par wcb 12 \par ua clean \par \par exam : Normal\par right groin swelling - reducible\par \par \par will get ct to r/o intra abdominal issues\par will meetin in dec for smoking cessation \par will consent for robo right inguinal h ernia with mesh / possibel right and all othe5r indicated procedure\par \par Counseling : Smoking Cessation.\par \par We discussed about how bad smoking is. We discussed various methods of smoking cessation and offered help. We discussed benefits of smoking cessation.\par We explained in great detail the pathophysiology of the disease process. We discussed the workup for diagnosis and management. The Post operative care was explained to the patient. He was counselled on diet , exercise and wound care. The Procedure was explained to the patient. The Risk , benefit and alternatives were discussed. We discussed recovery and possible complications. We discussed recurrence rate and complications including chronic abdominal pain. We also discussed hernia, surgery and  pain in relation to him  Job.\par \par We discussed the importance of close follow up. \par We informed that he needs to follow up in 1 month and then OR\par We also informed that he can call us if anything changes or has any questions.\par \par

## 2022-12-12 ENCOUNTER — NON-APPOINTMENT (OUTPATIENT)
Age: 57
End: 2022-12-12

## 2023-01-09 ENCOUNTER — RESULT REVIEW (OUTPATIENT)
Age: 58
End: 2023-01-09

## 2023-01-09 ENCOUNTER — OUTPATIENT (OUTPATIENT)
Dept: OUTPATIENT SERVICES | Facility: HOSPITAL | Age: 58
LOS: 1 days | Discharge: HOME | End: 2023-01-09
Payer: COMMERCIAL

## 2023-01-09 VITALS
HEART RATE: 81 BPM | TEMPERATURE: 98 F | DIASTOLIC BLOOD PRESSURE: 87 MMHG | RESPIRATION RATE: 18 BRPM | WEIGHT: 149.91 LBS | OXYGEN SATURATION: 97 % | SYSTOLIC BLOOD PRESSURE: 140 MMHG | HEIGHT: 67 IN

## 2023-01-09 DIAGNOSIS — Z01.818 ENCOUNTER FOR OTHER PREPROCEDURAL EXAMINATION: ICD-10-CM

## 2023-01-09 DIAGNOSIS — K40.90 UNILATERAL INGUINAL HERNIA, WITHOUT OBSTRUCTION OR GANGRENE, NOT SPECIFIED AS RECURRENT: ICD-10-CM

## 2023-01-09 LAB
ALBUMIN SERPL ELPH-MCNC: 5.1 G/DL — SIGNIFICANT CHANGE UP (ref 3.5–5.2)
ALP SERPL-CCNC: 68 U/L — SIGNIFICANT CHANGE UP (ref 30–115)
ALT FLD-CCNC: 23 U/L — SIGNIFICANT CHANGE UP (ref 0–41)
ANION GAP SERPL CALC-SCNC: 15 MMOL/L — HIGH (ref 7–14)
APPEARANCE UR: CLEAR — SIGNIFICANT CHANGE UP
APTT BLD: 31.8 SEC — SIGNIFICANT CHANGE UP (ref 27–39.2)
AST SERPL-CCNC: 23 U/L — SIGNIFICANT CHANGE UP (ref 0–41)
BASOPHILS # BLD AUTO: 0.05 K/UL — SIGNIFICANT CHANGE UP (ref 0–0.2)
BASOPHILS NFR BLD AUTO: 0.5 % — SIGNIFICANT CHANGE UP (ref 0–1)
BILIRUB SERPL-MCNC: 0.2 MG/DL — SIGNIFICANT CHANGE UP (ref 0.2–1.2)
BILIRUB UR-MCNC: NEGATIVE — SIGNIFICANT CHANGE UP
BUN SERPL-MCNC: 16 MG/DL — SIGNIFICANT CHANGE UP (ref 10–20)
CALCIUM SERPL-MCNC: 9.5 MG/DL — SIGNIFICANT CHANGE UP (ref 8.4–10.5)
CHLORIDE SERPL-SCNC: 98 MMOL/L — SIGNIFICANT CHANGE UP (ref 98–110)
CO2 SERPL-SCNC: 24 MMOL/L — SIGNIFICANT CHANGE UP (ref 17–32)
COLOR SPEC: COLORLESS — SIGNIFICANT CHANGE UP
CREAT SERPL-MCNC: 0.9 MG/DL — SIGNIFICANT CHANGE UP (ref 0.7–1.5)
DIFF PNL FLD: NEGATIVE — SIGNIFICANT CHANGE UP
EGFR: 101 ML/MIN/1.73M2 — SIGNIFICANT CHANGE UP
EOSINOPHIL # BLD AUTO: 0.45 K/UL — SIGNIFICANT CHANGE UP (ref 0–0.7)
EOSINOPHIL NFR BLD AUTO: 4.2 % — SIGNIFICANT CHANGE UP (ref 0–8)
GLUCOSE SERPL-MCNC: 90 MG/DL — SIGNIFICANT CHANGE UP (ref 70–99)
GLUCOSE UR QL: NEGATIVE — SIGNIFICANT CHANGE UP
HCT VFR BLD CALC: 45.5 % — SIGNIFICANT CHANGE UP (ref 42–52)
HGB BLD-MCNC: 15 G/DL — SIGNIFICANT CHANGE UP (ref 14–18)
IMM GRANULOCYTES NFR BLD AUTO: 0.5 % — HIGH (ref 0.1–0.3)
INR BLD: 1.01 RATIO — SIGNIFICANT CHANGE UP (ref 0.65–1.3)
KETONES UR-MCNC: NEGATIVE — SIGNIFICANT CHANGE UP
LEUKOCYTE ESTERASE UR-ACNC: NEGATIVE — SIGNIFICANT CHANGE UP
LYMPHOCYTES # BLD AUTO: 2.86 K/UL — SIGNIFICANT CHANGE UP (ref 1.2–3.4)
LYMPHOCYTES # BLD AUTO: 26.6 % — SIGNIFICANT CHANGE UP (ref 20.5–51.1)
MCHC RBC-ENTMCNC: 30.6 PG — SIGNIFICANT CHANGE UP (ref 27–31)
MCHC RBC-ENTMCNC: 33 G/DL — SIGNIFICANT CHANGE UP (ref 32–37)
MCV RBC AUTO: 92.9 FL — SIGNIFICANT CHANGE UP (ref 80–94)
MONOCYTES # BLD AUTO: 0.83 K/UL — HIGH (ref 0.1–0.6)
MONOCYTES NFR BLD AUTO: 7.7 % — SIGNIFICANT CHANGE UP (ref 1.7–9.3)
NEUTROPHILS # BLD AUTO: 6.53 K/UL — HIGH (ref 1.4–6.5)
NEUTROPHILS NFR BLD AUTO: 60.5 % — SIGNIFICANT CHANGE UP (ref 42.2–75.2)
NITRITE UR-MCNC: NEGATIVE — SIGNIFICANT CHANGE UP
NRBC # BLD: 0 /100 WBCS — SIGNIFICANT CHANGE UP (ref 0–0)
PH UR: 6 — SIGNIFICANT CHANGE UP (ref 5–8)
PLATELET # BLD AUTO: 215 K/UL — SIGNIFICANT CHANGE UP (ref 130–400)
POTASSIUM SERPL-MCNC: 4.8 MMOL/L — SIGNIFICANT CHANGE UP (ref 3.5–5)
POTASSIUM SERPL-SCNC: 4.8 MMOL/L — SIGNIFICANT CHANGE UP (ref 3.5–5)
PROT SERPL-MCNC: 7.4 G/DL — SIGNIFICANT CHANGE UP (ref 6–8)
PROT UR-MCNC: NEGATIVE — SIGNIFICANT CHANGE UP
PROTHROM AB SERPL-ACNC: 11.5 SEC — SIGNIFICANT CHANGE UP (ref 9.95–12.87)
RBC # BLD: 4.9 M/UL — SIGNIFICANT CHANGE UP (ref 4.7–6.1)
RBC # FLD: 13.5 % — SIGNIFICANT CHANGE UP (ref 11.5–14.5)
SODIUM SERPL-SCNC: 137 MMOL/L — SIGNIFICANT CHANGE UP (ref 135–146)
SP GR SPEC: 1.01 — SIGNIFICANT CHANGE UP (ref 1.01–1.03)
UROBILINOGEN FLD QL: SIGNIFICANT CHANGE UP
WBC # BLD: 10.77 K/UL — SIGNIFICANT CHANGE UP (ref 4.8–10.8)
WBC # FLD AUTO: 10.77 K/UL — SIGNIFICANT CHANGE UP (ref 4.8–10.8)

## 2023-01-09 PROCEDURE — 93010 ELECTROCARDIOGRAM REPORT: CPT

## 2023-01-09 PROCEDURE — 71046 X-RAY EXAM CHEST 2 VIEWS: CPT | Mod: 26

## 2023-01-09 NOTE — H&P PST ADULT - REASON FOR ADMISSION
56 Y/O M SCHEDULED FOR PAST FOR  LAPAROSCOPIC REPAIR OF RIGHT INGUINAL HERNIA WITH MESH POSSIBLE LEFT HERNIA REPAIR AND ALL OTHER INDICATED PROCEDURE ON 1/30/23 WITH DR BLAKELY UNDER GA.

## 2023-01-09 NOTE — H&P PST ADULT - HISTORY OF PRESENT ILLNESS
CURRENTLY  DENIES ANY CP, SOB, PALPITATIONS, COUGH OR DYSURIA  EXERCISE TOLERANCE 3 FOS WITHOUT SOB    AS PER PATIENT  this is his/her complete medical history including medications - PRESCRIPTIONS  OVER THE COUNTER MEDS    pt denies any covid s/s, or tested positive in the past.  Received covid vaccine X 3 DOSES  pt advised self quarantine till day of procedure    Anesthesia Alert  NO--Difficult Airway  NO--History of neck surgery or radiation  NO--Limited ROM of neck  NO--History of Malignant hyperthermia  NO--No personal or family history of Pseudocholinesterase deficiency.  NO--Prior Anesthesia Complication  NO--Latex Allergy  YES--Loose teeth LOWER FRONT TOOTH SLIGHTLY MOBILE FOR ABOUT 2 MONTHS. PT DOES NOT WANT TO GO TO DENTIST AND IS AWARE OF THR RISK OF LOOSE TOOTH AND SURGERY  NO--History of Rheumatoid Arthritis  NO--DAVID  NO--Bleeding risk  NO--Other_____    Patient verbalized understanding of instructions and was given the opportunity to ask questions and have them answered.

## 2023-01-09 NOTE — H&P PST ADULT - MUSCULOSKELETAL
normal/ROM intact/normal gait/strength 5/5 bilateral upper extremities/strength 5/5 bilateral lower extremities Mercedes Flap Text: The defect edges were debeveled with a #15 scalpel blade.  Given the location of the defect, shape of the defect and the proximity to free margins a Mercedes flap was deemed most appropriate.  Using a sterile surgical marker, an appropriate advancement flap was drawn incorporating the defect and placing the expected incisions within the relaxed skin tension lines where possible. The area thus outlined was incised deep to adipose tissue with a #15 scalpel blade.  The skin margins were undermined to an appropriate distance in all directions utilizing iris scissors.

## 2023-01-09 NOTE — H&P PST ADULT - NSICDXFAMILYHX_GEN_ALL_CORE_FT
FAMILY HISTORY:  FH: colon cancer    Sibling  Still living? Yes, Estimated age: Age Unknown  Family history of diabetes mellitus (DM), Age at diagnosis: Age Unknown

## 2023-01-27 ENCOUNTER — LABORATORY RESULT (OUTPATIENT)
Age: 58
End: 2023-01-27

## 2023-01-28 NOTE — ASU PATIENT PROFILE, ADULT - FALL HARM RISK - UNIVERSAL INTERVENTIONS
Bed in lowest position, wheels locked, appropriate side rails in place/Call bell, personal items and telephone in reach/Instruct patient to call for assistance before getting out of bed or chair/Non-slip footwear when patient is out of bed/[place] to call system/Physically safe environment - no spills, clutter or unnecessary equipment/Purposeful Proactive Rounding/Room/bathroom lighting operational, light cord in reach

## 2023-01-30 ENCOUNTER — TRANSCRIPTION ENCOUNTER (OUTPATIENT)
Age: 58
End: 2023-01-30

## 2023-01-30 ENCOUNTER — OUTPATIENT (OUTPATIENT)
Dept: OUTPATIENT SERVICES | Facility: HOSPITAL | Age: 58
LOS: 1 days | Discharge: HOME | End: 2023-01-30
Payer: COMMERCIAL

## 2023-01-30 ENCOUNTER — APPOINTMENT (OUTPATIENT)
Dept: SURGERY | Facility: HOSPITAL | Age: 58
End: 2023-01-30

## 2023-01-30 VITALS
DIASTOLIC BLOOD PRESSURE: 79 MMHG | HEART RATE: 78 BPM | OXYGEN SATURATION: 97 % | WEIGHT: 149.91 LBS | SYSTOLIC BLOOD PRESSURE: 152 MMHG | HEIGHT: 67 IN | TEMPERATURE: 99 F

## 2023-01-30 VITALS
DIASTOLIC BLOOD PRESSURE: 66 MMHG | HEART RATE: 82 BPM | SYSTOLIC BLOOD PRESSURE: 126 MMHG | OXYGEN SATURATION: 98 % | RESPIRATION RATE: 16 BRPM

## 2023-01-30 PROCEDURE — 49650 LAP ING HERNIA REPAIR INIT: CPT | Mod: 50

## 2023-01-30 RX ORDER — BUPIVACAINE 13.3 MG/ML
20 INJECTION, SUSPENSION, LIPOSOMAL INFILTRATION ONCE
Refills: 0 | Status: DISCONTINUED | OUTPATIENT
Start: 2023-01-30 | End: 2023-01-30

## 2023-01-30 RX ORDER — HYDROMORPHONE HYDROCHLORIDE 2 MG/ML
0.5 INJECTION INTRAMUSCULAR; INTRAVENOUS; SUBCUTANEOUS
Refills: 0 | Status: DISCONTINUED | OUTPATIENT
Start: 2023-01-30 | End: 2023-01-30

## 2023-01-30 RX ORDER — SODIUM CHLORIDE 9 MG/ML
1000 INJECTION, SOLUTION INTRAVENOUS
Refills: 0 | Status: DISCONTINUED | OUTPATIENT
Start: 2023-01-30 | End: 2023-01-30

## 2023-01-30 RX ORDER — OXYCODONE AND ACETAMINOPHEN 5; 325 MG/1; MG/1
2 TABLET ORAL ONCE
Refills: 0 | Status: DISCONTINUED | OUTPATIENT
Start: 2023-01-30 | End: 2023-01-30

## 2023-01-30 RX ORDER — OXYCODONE HYDROCHLORIDE 5 MG/1
1 TABLET ORAL
Qty: 5 | Refills: 0
Start: 2023-01-30 | End: 2023-01-31

## 2023-01-30 RX ORDER — OXYCODONE AND ACETAMINOPHEN 5; 325 MG/1; MG/1
1 TABLET ORAL ONCE
Refills: 0 | Status: DISCONTINUED | OUTPATIENT
Start: 2023-01-30 | End: 2023-01-30

## 2023-01-30 RX ORDER — MEPERIDINE HYDROCHLORIDE 50 MG/ML
12.5 INJECTION INTRAMUSCULAR; INTRAVENOUS; SUBCUTANEOUS
Refills: 0 | Status: DISCONTINUED | OUTPATIENT
Start: 2023-01-30 | End: 2023-01-30

## 2023-01-30 RX ORDER — ONDANSETRON 8 MG/1
4 TABLET, FILM COATED ORAL ONCE
Refills: 0 | Status: COMPLETED | OUTPATIENT
Start: 2023-01-30 | End: 2023-01-30

## 2023-01-30 RX ORDER — HYDROMORPHONE HYDROCHLORIDE 2 MG/ML
1 INJECTION INTRAMUSCULAR; INTRAVENOUS; SUBCUTANEOUS
Refills: 0 | Status: DISCONTINUED | OUTPATIENT
Start: 2023-01-30 | End: 2023-01-30

## 2023-01-30 RX ADMIN — HYDROMORPHONE HYDROCHLORIDE 0.5 MILLIGRAM(S): 2 INJECTION INTRAMUSCULAR; INTRAVENOUS; SUBCUTANEOUS at 12:22

## 2023-01-30 RX ADMIN — HYDROMORPHONE HYDROCHLORIDE 0.5 MILLIGRAM(S): 2 INJECTION INTRAMUSCULAR; INTRAVENOUS; SUBCUTANEOUS at 12:25

## 2023-01-30 RX ADMIN — HYDROMORPHONE HYDROCHLORIDE 0.5 MILLIGRAM(S): 2 INJECTION INTRAMUSCULAR; INTRAVENOUS; SUBCUTANEOUS at 12:00

## 2023-01-30 RX ADMIN — ONDANSETRON 4 MILLIGRAM(S): 8 TABLET, FILM COATED ORAL at 12:38

## 2023-01-30 NOTE — ASU DISCHARGE PLAN (ADULT/PEDIATRIC) - CARE PROVIDER_API CALL
Dustin Farr (DAVID)  Surgery  [address] [place] [address], [address] [place] [address]  Phone: [phone]  Fax: [phone]  Follow Up Time:

## 2023-01-30 NOTE — BRIEF OPERATIVE NOTE - NSICDXBRIEFPROCEDURE_GEN_ALL_CORE_FT
PROCEDURES:  Laparoscopic repair of bilateral inguinal hernias with mesh 30-Jan-2023 11:22:07  Teresa Almaraz  Block, transversus abdominis plane, bilateral 30-Jan-2023 11:22:22  Teresa Almaraz

## 2023-01-30 NOTE — ASU DISCHARGE PLAN (ADULT/PEDIATRIC) - ASU DC SPECIAL INSTRUCTIONSFT
You are being discharged from [facility]. Please contact the phone number provided and schedule a follow-up appointment in Dr. Farr's office in 1-2 weeks. Please take over the counter Tylenol and Ibuprofen for pain control. You have been prescribed oxycodone for breakthrough pain relief, please only take as needed and take as directed. Do not drive while taking narcotic pain medication. Keep scrotal support in place for comfort. Please avoid heavy weight lifting for the next 4-6 weeks.  If you have any further questions about your care, please do not hesitate to contact Dr. Farr's office.

## 2023-01-31 DIAGNOSIS — G89.18 OTHER ACUTE POSTPROCEDURAL PAIN: ICD-10-CM

## 2023-01-31 RX ORDER — OXYCODONE 5 MG/1
5 TABLET ORAL EVERY 6 HOURS
Qty: 20 | Refills: 0 | Status: ACTIVE | COMMUNITY
Start: 2023-01-31 | End: 1900-01-01

## 2023-02-03 DIAGNOSIS — Z88.0 ALLERGY STATUS TO PENICILLIN: ICD-10-CM

## 2023-02-03 DIAGNOSIS — R01.1 CARDIAC MURMUR, UNSPECIFIED: ICD-10-CM

## 2023-02-03 DIAGNOSIS — K40.20 BILATERAL INGUINAL HERNIA, WITHOUT OBSTRUCTION OR GANGRENE, NOT SPECIFIED AS RECURRENT: ICD-10-CM

## 2023-02-13 PROBLEM — R01.1 CARDIAC MURMUR, UNSPECIFIED: Chronic | Status: ACTIVE | Noted: 2023-01-09

## 2023-02-13 PROBLEM — R91.1 SOLITARY PULMONARY NODULE: Chronic | Status: ACTIVE | Noted: 2023-01-09

## 2023-02-15 ENCOUNTER — APPOINTMENT (OUTPATIENT)
Dept: SURGERY | Facility: CLINIC | Age: 58
End: 2023-02-15
Payer: COMMERCIAL

## 2023-02-15 VITALS
SYSTOLIC BLOOD PRESSURE: 120 MMHG | TEMPERATURE: 98.1 F | BODY MASS INDEX: 22.76 KG/M2 | HEIGHT: 67 IN | HEART RATE: 85 BPM | WEIGHT: 145 LBS | DIASTOLIC BLOOD PRESSURE: 80 MMHG | OXYGEN SATURATION: 96 %

## 2023-02-15 DIAGNOSIS — K40.90 UNILATERAL INGUINAL HERNIA, W/OUT OBSTRUCTION OR GANGRENE, NOT SPECIFIED AS RECURRENT: ICD-10-CM

## 2023-02-15 PROCEDURE — 99024 POSTOP FOLLOW-UP VISIT: CPT

## 2023-02-15 NOTE — ASSESSMENT
[FreeTextEntry1] : Mr. ERYN ARAGON is a 57 year  year old man. He presents to the office on 11/23/2022 , his primary is Doesnt have PCP .\par \par smoker  1pack a day  \par carpeneter\par on light duty \par right groin pain and swelling\par h/o divertiuclits\par wcb 12 \par ua clean \par \par 2/15: He is doing well. His scars have healed well. No pain or hernia now. \par \par exam : Normal\par no hernia\par \par \par Counseling : Smoking Cessation.\par \par We discussed about how bad smoking is. We discussed various methods of smoking cessation and offered help. We discussed benefits of smoking cessation.\par We explained in great detail the pathophysiology of the disease process. We discussed the workup for diagnosis and management. The Post operative care was explained to the patient. He was counselled on diet , exercise and wound care. The Procedure was explained to the patient. The Risk , benefit and alternatives were discussed. We discussed recovery and possible complications. We discussed recurrence rate and complications including chronic abdominal pain. We also discussed hernia, surgery and  pain in relation to him  Job.\par \par We discussed the importance of close follow up. \par We informed that he needs to follow up as needed\par We also informed that he can call us if anything changes or has any questions.\par \par

## 2023-02-15 NOTE — PHYSICAL EXAM
[Purpura] : no purpura  [Alert] : alert [Calm] : calm [de-identified] : Normal [de-identified] : No hernia

## 2023-02-15 NOTE — HISTORY OF PRESENT ILLNESS
[de-identified] : Mr. ERYN ARAGON is a 57 year  year old man. He presents to the office on 11/23/2022 , his primary is Doesnt have PCP .\par \par smoker  1pack a day  \par carpeneter\par on light duty \par right groin pain and swelling\par h/o divertiuclits\par wcb 12 \par ua clean \par \par 2/15: He is doing well. His scars have healed well. No pain or hernia now. \par

## 2023-06-01 ENCOUNTER — EMERGENCY (EMERGENCY)
Facility: HOSPITAL | Age: 58
LOS: 0 days | Discharge: HOME | End: 2023-06-01
Attending: STUDENT IN AN ORGANIZED HEALTH CARE EDUCATION/TRAINING PROGRAM
Payer: COMMERCIAL

## 2023-06-01 VITALS
SYSTOLIC BLOOD PRESSURE: 189 MMHG | WEIGHT: 149.91 LBS | DIASTOLIC BLOOD PRESSURE: 95 MMHG | OXYGEN SATURATION: 100 % | TEMPERATURE: 98 F | RESPIRATION RATE: 16 BRPM | HEIGHT: 70 IN | HEART RATE: 98 BPM

## 2023-06-01 DIAGNOSIS — Z88.1 ALLERGY STATUS TO OTHER ANTIBIOTIC AGENTS STATUS: ICD-10-CM

## 2023-06-01 DIAGNOSIS — Z87.19 PERSONAL HISTORY OF OTHER DISEASES OF THE DIGESTIVE SYSTEM: ICD-10-CM

## 2023-06-01 DIAGNOSIS — M54.50 LOW BACK PAIN, UNSPECIFIED: ICD-10-CM

## 2023-06-01 DIAGNOSIS — F17.200 NICOTINE DEPENDENCE, UNSPECIFIED, UNCOMPLICATED: ICD-10-CM

## 2023-06-01 DIAGNOSIS — Z87.39 PERSONAL HISTORY OF OTHER DISEASES OF THE MUSCULOSKELETAL SYSTEM AND CONNECTIVE TISSUE: ICD-10-CM

## 2023-06-01 DIAGNOSIS — Z86.79 PERSONAL HISTORY OF OTHER DISEASES OF THE CIRCULATORY SYSTEM: ICD-10-CM

## 2023-06-01 DIAGNOSIS — Z87.09 PERSONAL HISTORY OF OTHER DISEASES OF THE RESPIRATORY SYSTEM: ICD-10-CM

## 2023-06-01 PROCEDURE — 99284 EMERGENCY DEPT VISIT MOD MDM: CPT

## 2023-06-01 PROCEDURE — 96372 THER/PROPH/DIAG INJ SC/IM: CPT

## 2023-06-01 PROCEDURE — 99283 EMERGENCY DEPT VISIT LOW MDM: CPT | Mod: 25

## 2023-06-01 RX ORDER — LIDOCAINE 4 G/100G
1 CREAM TOPICAL ONCE
Refills: 0 | Status: COMPLETED | OUTPATIENT
Start: 2023-06-01 | End: 2023-06-01

## 2023-06-01 RX ORDER — KETOROLAC TROMETHAMINE 30 MG/ML
60 SYRINGE (ML) INJECTION ONCE
Refills: 0 | Status: DISCONTINUED | OUTPATIENT
Start: 2023-06-01 | End: 2023-06-01

## 2023-06-01 RX ORDER — METHOCARBAMOL 500 MG/1
2 TABLET, FILM COATED ORAL
Qty: 40 | Refills: 0
Start: 2023-06-01 | End: 2023-06-05

## 2023-06-01 RX ORDER — ACETAMINOPHEN 500 MG
975 TABLET ORAL ONCE
Refills: 0 | Status: COMPLETED | OUTPATIENT
Start: 2023-06-01 | End: 2023-06-01

## 2023-06-01 RX ORDER — DEXAMETHASONE 0.5 MG/5ML
10 ELIXIR ORAL ONCE
Refills: 0 | Status: COMPLETED | OUTPATIENT
Start: 2023-06-01 | End: 2023-06-01

## 2023-06-01 RX ORDER — LIDOCAINE 4 G/100G
1 CREAM TOPICAL
Qty: 2 | Refills: 0
Start: 2023-06-01 | End: 2023-06-05

## 2023-06-01 RX ADMIN — Medication 975 MILLIGRAM(S): at 08:43

## 2023-06-01 RX ADMIN — LIDOCAINE 1 PATCH: 4 CREAM TOPICAL at 08:42

## 2023-06-01 RX ADMIN — Medication 10 MILLIGRAM(S): at 08:44

## 2023-06-01 RX ADMIN — Medication 60 MILLIGRAM(S): at 08:44

## 2023-06-01 NOTE — ED PROVIDER NOTE - NSFOLLOWUPCLINICS_GEN_ALL_ED_FT
[facility]  Physical Therapy  Multiple Location  [place]   Phone: [phone]  Fax:   Follow Up Time: 1-3 Days

## 2023-06-01 NOTE — ED PROVIDER NOTE - OBJECTIVE STATEMENT
Patient is a 58-year-old male with past medical history of sciatica, current smoker presenting for back pain.  Pain began on Saturday, no preceding trauma or falls, heavy lifting.  Pain is in his lower back, at times radiates down the back of his left leg to the knee.  Denies incontinence or inability to defecate, urinate.  Denies fever, chills, IV drug use.  Denies weakness, numbness.  Patient says pain has been improved with Tylenol and Motrin but is minimally responsive.  Patient otherwise well

## 2023-06-01 NOTE — ED PROVIDER NOTE - CLINICAL SUMMARY MEDICAL DECISION MAKING FREE TEXT BOX
58-year-old male presented today with back pain.  Patient is hemodynamically stable and neurologically intact at this time.  Patient's back pain is similar to his previous episodes of sciatica.  Patient given medications and had improvement in his symptomatology.  Patient at this time will be discharged close follow-up with PMD/neurosurgery.  Return precautions explained to patient.

## 2023-06-01 NOTE — ED PROVIDER NOTE - PATIENT PORTAL LINK FT
You can access the FollowMyHealth Patient Portal offered by [facility] by registering at the following website: [email or web address]. By joining [employer]’s FollowMyHealth portal, you will also be able to view your health information using other applications (apps) compatible with our system.

## 2023-06-01 NOTE — ED PROVIDER NOTE - NS_EDPROVIDERDISPOUSERTYPE_ED_A_ED
COVID Follow-up Call 1    Situation: This patient triggered as a high risk positive COVID-19 after a recent clinical encounter. A care transitions call occurred and is summarized below.    Background: Patient is [place] and has a very sore throat.   Daughter was able to ask RN some questions and will be the connection for RN and patient for now.  Patient is drinking okay and feeling a little better since her er visit     Assessment:   Covid-19 Symptom Assessment  Covid-19 Case Type: Positive Covid-19 Test ([facility])  Date of Covid-19 Symptom Onset: 12/02/20  Current Symptoms: Cough, Weakness, Shortness of breath  Symptom Change Since Last Assessment: Improving  Self-Isolation Status (See Link in Sidebar): Continue self-isolation    Additional topics reviewed with patient:   · Since last virtual visit, patient is feeling OK  · Patient is taking all medications as prescribed  · Patient did not have questions or concerns regarding the medications prescribed    Recommendation:  Reinforced patient instructions provided by v-visit/ED provider, including continued self-monitoring of symptoms and self-quarantine until 12/14/2020.  Patient verbalized understanding to contact PCP for worsening symptoms.     Attending Attestation (For Attendings USE Only)...

## 2023-06-01 NOTE — ED PROVIDER NOTE - NS ED MD DISPO DISCHARGE CCDA
DVT: lovenox BID  Diet: puree, mildly thick liquids  Dispo: pending S+S and PT re-eval         Full Code Patient/Caregiver provided printed discharge information.

## 2023-06-01 NOTE — ED PROVIDER NOTE - PHYSICAL EXAMINATION
CONSTITUTIONAL: Well-developed; well-nourished; NAD  SKIN: warm, dry, w/o rash  HEAD: NCAT  EYES: PERRLA, EOMI, no conjunctival injection  ENT: No nasal discharge; nl OP without erythema or exudates  NECK: Supple, non-tender  CARD: nl S1, S2; RRR, no MRG, no JVD  RESP: CTAB, normal respiratory effort  ABD: BS+, soft, NTND, no HSM  Back: +lumbar paraspinal TTP, no midline TTP; +straight leg test on the LT  EXT: Normal ROM.  No clubbing, cyanosis or edema  NEURO: Alert, oriented, grossly unremarkable  PSYCH: Cooperative, appropriate

## 2023-06-01 NOTE — ED PROVIDER NOTE - NSFOLLOWUPINSTRUCTIONS_ED_ALL_ED_FT
Back Pain    Back pain is very common in adults. The cause of back pain is rarely dangerous and the pain often gets better over time. The cause of your back pain may not be known and may include strain of muscles or ligaments, degeneration of the spinal disks, or arthritis. Occasionally the pain may radiate down your leg(s). Over-the-counter medicines to reduce pain and inflammation are often the most helpful. Stretching and remaining active frequently helps the healing process.     SEEK IMMEDIATE MEDICAL CARE IF YOU HAVE THE FOLLOWING SYMPTOMS: bowel or bladder control problems, unusual weakness or numbness in your arms or legs, nausea or vomiting, abdominal pain, fever, dizziness/lightheadedness.     Sciatica    WHAT YOU NEED TO KNOW:    Sciatica is a condition that causes pain along your sciatic nerve. The sciatic nerve runs from your spine through both sides of your buttocks. It then runs down the back of your thigh, into your lower leg and foot. Your sciatic nerve may be compressed, inflamed, irritated, or stretched.          DISCHARGE INSTRUCTIONS:    Medicines:     NSAIDs: These medicines decrease swelling and pain. NSAIDs are available without a doctor's order. Ask your healthcare provider which medicine is right for you. Ask how much to take and when to take it. Take as directed. NSAIDs can cause stomach bleeding or kidney problems if not taken correctly.      Acetaminophen: This medicine decreases pain. Acetaminophen is available without a doctor's order. Ask how much to take and when to take it. Follow directions. Acetaminophen can cause liver damage if not taken correctly.      Muscle relaxers help decrease pain and muscle spasms.      Take your medicine as directed. Contact your healthcare provider if you think your medicine is not helping or if you have side effects. Tell him of her if you are allergic to any medicine. Keep a list of the medicines, vitamins, and herbs you take. Include the amounts, and when and why you take them. Bring the list or the pill bottles to follow-up visits. Carry your medicine list with you in case of an emergency.    Follow up with your healthcare provider as directed: Write down your questions so you remember to ask them during your visits.     Manage your symptoms:     Activity: Decrease your activity. Do not lift heavy objects or twist your back for at least 6 weeks. Slowly return to your usual activity.      Ice: Ice helps decrease swelling and pain. Ice may also help prevent tissue damage. Use an ice pack, or put crushed ice in a plastic bag. Cover it with a towel and place it on your low back or leg for 15 to 20 minutes every hour or as directed.      Heat: Heat helps decrease pain and muscle spasms. Apply heat on the area for 20 to 30 minutes every 2 hours for as many days as directed.       Physical therapy: You may need to see physical therapist to teach you exercises to help improve movement and strength, and to decrease pain. An occupational therapist teaches you skills to help with your daily activities.       Use assistive devices if directed: You may need to wear back support, such as a back brace. You may need crutches, a cane, or a walker to decrease stress on your lower back and leg muscles. Ask your healthcare provider for more information about assistive devices and how to use them correctly.    Self-care:     Avoid pressure on your back and legs: Do not lift heavy objects, or stand or sit for long periods of time.      Lift objects safely: Keep your back straight and bend your knees when you  an object. Do not bend or twist your back when you lift.      Maintain a healthy weight: Ask your healthcare provider how much you should weigh. Ask him to help you create a weight loss plan if you are overweight.       Exercise: Ask your healthcare provider about the best stretching, warmup, and exercise plan for you.     Contact your healthcare provider if:     You have pain in your lower back at night or when resting.      You have pain in your lower back with numbness below the knee.      You have weakness in one leg only.      You have questions or concerns about your condition or care.    Return to the emergency department if:     You have trouble holding back your urine or bowel movements.      You have weakness in both legs.      You have numbness in your groin or buttocks.         © Copyright [employer] 2019 All illustrations and images included in CareNotes are the copyrighted property of [employer] or [employer]. Our Emergency Department Referral Coordinators will be reaching out to you in the next 24-48 hours from 9:00am to 5:00pm with a follow up appointment. Please expect a phone call from the hospital in that time frame. If you do not receive a call or if you have any questions or concerns, you can reach them at   [phone]      Back Pain    Back pain is very common in adults. The cause of back pain is rarely dangerous and the pain often gets better over time. The cause of your back pain may not be known and may include strain of muscles or ligaments, degeneration of the spinal disks, or arthritis. Occasionally the pain may radiate down your leg(s). Over-the-counter medicines to reduce pain and inflammation are often the most helpful. Stretching and remaining active frequently helps the healing process.     SEEK IMMEDIATE MEDICAL CARE IF YOU HAVE THE FOLLOWING SYMPTOMS: bowel or bladder control problems, unusual weakness or numbness in your arms or legs, nausea or vomiting, abdominal pain, fever, dizziness/lightheadedness.     Sciatica    WHAT YOU NEED TO KNOW:    Sciatica is a condition that causes pain along your sciatic nerve. The sciatic nerve runs from your spine through both sides of your buttocks. It then runs down the back of your thigh, into your lower leg and foot. Your sciatic nerve may be compressed, inflamed, irritated, or stretched.          DISCHARGE INSTRUCTIONS:    Medicines:     NSAIDs: These medicines decrease swelling and pain. NSAIDs are available without a doctor's order. Ask your healthcare provider which medicine is right for you. Ask how much to take and when to take it. Take as directed. NSAIDs can cause stomach bleeding or kidney problems if not taken correctly.      Acetaminophen: This medicine decreases pain. Acetaminophen is available without a doctor's order. Ask how much to take and when to take it. Follow directions. Acetaminophen can cause liver damage if not taken correctly.      Muscle relaxers help decrease pain and muscle spasms.      Take your medicine as directed. Contact your healthcare provider if you think your medicine is not helping or if you have side effects. Tell him of her if you are allergic to any medicine. Keep a list of the medicines, vitamins, and herbs you take. Include the amounts, and when and why you take them. Bring the list or the pill bottles to follow-up visits. Carry your medicine list with you in case of an emergency.    Follow up with your healthcare provider as directed: Write down your questions so you remember to ask them during your visits.     Manage your symptoms:     Activity: Decrease your activity. Do not lift heavy objects or twist your back for at least 6 weeks. Slowly return to your usual activity.      Ice: Ice helps decrease swelling and pain. Ice may also help prevent tissue damage. Use an ice pack, or put crushed ice in a plastic bag. Cover it with a towel and place it on your low back or leg for 15 to 20 minutes every hour or as directed.      Heat: Heat helps decrease pain and muscle spasms. Apply heat on the area for 20 to 30 minutes every 2 hours for as many days as directed.       Physical therapy: You may need to see physical therapist to teach you exercises to help improve movement and strength, and to decrease pain. An occupational therapist teaches you skills to help with your daily activities.       Use assistive devices if directed: You may need to wear back support, such as a back brace. You may need crutches, a cane, or a walker to decrease stress on your lower back and leg muscles. Ask your healthcare provider for more information about assistive devices and how to use them correctly.    Self-care:     Avoid pressure on your back and legs: Do not lift heavy objects, or stand or sit for long periods of time.      Lift objects safely: Keep your back straight and bend your knees when you  an object. Do not bend or twist your back when you lift.      Maintain a healthy weight: Ask your healthcare provider how much you should weigh. Ask him to help you create a weight loss plan if you are overweight.       Exercise: Ask your healthcare provider about the best stretching, warmup, and exercise plan for you.     Contact your healthcare provider if:     You have pain in your lower back at night or when resting.      You have pain in your lower back with numbness below the knee.      You have weakness in one leg only.      You have questions or concerns about your condition or care.    Return to the emergency department if:     You have trouble holding back your urine or bowel movements.      You have weakness in both legs.      You have numbness in your groin or buttocks.         © Copyright [employer] 2019 All illustrations and images included in CareNotes are the copyrighted property of [email or web address] or [employer].

## 2023-06-01 NOTE — ED PROVIDER NOTE - ADDITIONAL NOTES AND INSTRUCTIONS:
Patient seen in emergency department for back pain since 5/28, please excuse from work until 6/2 or symptoms improve

## 2023-08-09 ENCOUNTER — EMERGENCY (EMERGENCY)
Facility: HOSPITAL | Age: 58
LOS: 0 days | Discharge: HOME | End: 2023-08-09
Attending: EMERGENCY MEDICINE
Payer: COMMERCIAL

## 2023-08-09 VITALS
HEART RATE: 102 BPM | SYSTOLIC BLOOD PRESSURE: 138 MMHG | OXYGEN SATURATION: 99 % | TEMPERATURE: 98 F | WEIGHT: 149.91 LBS | RESPIRATION RATE: 18 BRPM | DIASTOLIC BLOOD PRESSURE: 78 MMHG | HEIGHT: 67 IN
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
## 2023-08-09 DIAGNOSIS — L50.9 URTICARIA, UNSPECIFIED: ICD-10-CM
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
## 2023-08-09 DIAGNOSIS — Z88.0 ALLERGY STATUS TO PENICILLIN: ICD-10-CM

## 2023-08-09 DIAGNOSIS — T78.1XXA OTHER ADVERSE FOOD REACTIONS, NOT ELSEWHERE CLASSIFIED, INITIAL ENCOUNTER: ICD-10-CM

## 2023-08-09 PROCEDURE — 99283 EMERGENCY DEPT VISIT LOW MDM: CPT

## 2023-08-09 PROCEDURE — 99284 EMERGENCY DEPT VISIT MOD MDM: CPT

## 2023-08-09 RX ORDER — FAMOTIDINE 10 MG/ML
20 INJECTION INTRAVENOUS ONCE
Refills: 0 | Status: COMPLETED | OUTPATIENT
Start: 2023-08-09 | End: 2023-08-09

## 2023-08-09 RX ADMIN — Medication 60 MILLIGRAM(S): at 08:46

## 2023-08-09 RX ADMIN — FAMOTIDINE 20 MILLIGRAM(S): 10 INJECTION INTRAVENOUS at 08:46

## 2023-08-09 NOTE — ED PROVIDER NOTE - PHYSICAL EXAMINATION
Medication:   Requested Prescriptions     Pending Prescriptions Disp Refills    lisinopril (PRINIVIL;ZESTRIL) 20 MG tablet [Pharmacy Med Name: LISINOPRIL 20 MG TABLET] 90 tablet 2     Sig: TAKE 1 TABLET BY MOUTH EVERY DAY        Last Filled:      Patient Phone Number: [phone] (home)     Last appt: 3/25/2022   Next appt: Visit date not found    Last OARRS:   RX Monitoring 5/4/2021   Attestation -   Acute Pain Prescriptions -   Periodic Controlled Substance Monitoring Possible medication side effects, risk of tolerance/dependence & alternative treatments discussed. ;No signs of potential drug abuse or diversion identified.    Chronic Pain > 50 MEDD -   Chronic Pain > 80 MEDD -
CONST: Well appearing in NAD  EYES: PERRL, EOMI, Sclera and conjunctiva clear.   ENT: Oropharynx normal appearing, no erythema or exudates. Uvula midline.  NECK: Non-tender, no meningeal signs  CARD: Normal S1 S2; Normal rate and rhythm  RESP: Equal BS B/L, No wheezes, rhonchi or rales. No distress  GI: Soft, non-tender, non-distended.  MS: Normal ROM in all extremities. No midline spinal tenderness.  SKIN: fading red rash to arms and legs. No mucosal membrane involvement   NEURO: A&Ox3, No focal deficits. Strength 5/5 with no sensory deficits. Steady gait

## 2023-08-09 NOTE — ED PROVIDER NOTE - PATIENT PORTAL LINK FT
You can access the FollowMyHealth Patient Portal offered by [facility] by registering at the following website: [email or web address]. By joining [employer]’s FollowMyHealth portal, you will also be able to view your health information using other applications (apps) compatible with our system.

## 2023-08-09 NOTE — ED PROVIDER NOTE - OBJECTIVE STATEMENT
Pt presents with itchy red hive rash that began last night one hour after eating peaches. Denies NV, abd pain, SOB, cough. Has hx of nut allergy but never peaches. Took benadryl last night and then again this morning and rash improved

## 2023-08-09 NOTE — ED PROVIDER NOTE - ATTENDING APP SHARED VISIT CONTRIBUTION OF CARE
58-year-old male presenting for evaluation of pruritic skin rash since last night after eating peaches.  Denies any swelling of the tongue, throat, shortness of breath, wheezing, nausea vomiting, abdominal or any other additional complaints.  Patient took Benadryl last night and this morning with mild improvement in symptoms. Well-appearing male in no acute distress, PERRL, pink conjunctiva, normal oral mucosa, normal work of breathing, lungs clear to auscultation bilaterally, RRR, well-perfused extremities, abdomen soft/NT/ND, urticaria present on upper and lower extremities as well as the trunk, patient is awake and alert.  His vital signs are normal.  Patient was given dose of famotidine and prednisone in ED.  Prescription sent to his pharmacy, vital signs reviewed.  Anticipatory guidance provided, strict return precautions given.  Patient verbalized understanding is amenable to plan.  He was given opportunity to ask questions.

## 2023-08-09 NOTE — ED ADULT TRIAGE NOTE - CHIEF COMPLAINT QUOTE
pt here for allergic reaction to peaches last night. pt presents with hives/redness all over body. pt denies diff breathing or swallowing. pt took benadryl last night

## 2023-09-08 NOTE — ED ADULT TRIAGE NOTE - SOURCE OF INFORMATION
Physical Therapy Discharge Instructions      [facility] - [address] [place] [address]  [phone] fax      Patient: Xavier Bustos  : 1940      Continue Home Exercise Program 1 times per day:      Continue with    [x] Ice  as needed     [] Heat           Follow up with MD:     [] Upon completion of therapy     [x] As needed      Recommendations:     [x]   Return to activity with home program    []   Return to activity with the following modifications:       []Post Rehab Program    []Join Independent aquatic program     []Return to/join local gym      Awais Montelongo, PTA  2023 Patient

## 2023-10-08 NOTE — ED PROVIDER NOTE - NS ED ROS FT
English Review of Systems:  	•	CONSTITUTIONAL: no fever  	•	SKIN: no rash  	•	ENT: no sore throat   	•	GI: +abd pain, no nausea, no vomiting, no diarrhea, no constipation  	•	GENITO-URINARY: no discharge, no dysuria; no hematuria, no increased urinary frequency  	•	NEUROLOGIC: no weakness, no headache  	•	PSYCH: no anxiety, non suicidal, non homicidal, no hallucination, no depression

## 2023-12-09 NOTE — ASU DISCHARGE PLAN (ADULT/PEDIATRIC) - ASU DISCHARGE DATE/TIME
OFFICE VISIT      Patient: Baltazar Cosme   : 1960 MRN: [id number]    SUBJECTIVE:  Chief Complaint   Patient presents with    Diabetic List Follow Up    Medication Issue     Ozempic causing nausea/vomiting/diarrhea         A 63 year old male is here for follow up of diabetes and medication management.     Patient has given consent to record this visit for documentation in their clinical record.     HISTORY OF PRESENT ILLNESS:  Type 2 diabetes mellitus without complication, with long-term current use of insulin (CMD): HbA1c was at 6.3% in recent labs, it was at 5.9% six months ago. Blood glucose was at 121 mg/dL. Reports nausea, vomiting and diarrhea with Ozempic 2 mg dose, was doing good with Ozempic 1 mg. Last Ozempic was taken on Thursday and had vomiting this morning. Took Zofran with improvement; however, it caused him constipation. Experiencing low blood glucose level. Requests refill for insulin Humalog pen injector.     Essential hypertension: Taking metoprolol with improvement. Requests refill for Lisinopril 10 mg. Blood pressure was low this morning, probably due to vomiting. Has scheduled visit with cardiologist in 2024.      Mixed hyperlipidemia: Requests refill for Atorvastatin 80 mg and fenofibrate 160 mg. HDL was at 39 mg/dL.     Major depressive disorder, recurrent episode, moderate (CMD): Doing well.     Obesity (BMI 30-39.9): Trying to increased protein intake.      Recurrent pulmonary embolism (CMD): Doing well with Eliquis.      Need for vaccination: Due for influenza vaccine and COVID-19 booster.      Screening for colorectal cancer: Due for colonoscopy.     Additional comments:   Labs: Electrolytes were normal in recent labs. Kidney functions were same as in previous labs. GFR was at 67, it was at 79 six months ago.  Calcium level and liver enzymes were normal. Has low alkaline phosphate. No protein in the urine. Tyroid levels were normal.        MEDICATIONS:  Current Outpatient  Medications   Medication Sig    fenofibrate 160 MG tablet Take 1 tablet by mouth daily.    atorvastatin (LIPITOR) 80 MG tablet Take 1 tablet by mouth daily.    lisinopril (ZESTRIL) 10 MG tablet Take 1 tablet by mouth daily.    Insulin Lispro, 1 Unit Dial, (HumaLOG KwikPen) 100 UNIT/ML pen-injector INJECT UNDER THE SKIN FOUR TIMES DAILY WITH SLIDING SCALE AS DIRECTED, MAX 50 UNITS DAILY    ondansetron (ZOFRAN) 8 MG tablet Take 1 tablet by mouth every 8 hours as needed for Nausea.    Lantus SoloStar 100 UNIT/ML pen-injector ADMINISTER 30 UNITS UNDER THE SKIN IN THE MORNING AND IN THE EVENING. PRIME 2 UNITS BEFORE EACH DOSE    Continuous Blood Gluc Sensor (FreeStyle Meredith 2 Sensor) Misc USE AS DIRECTED, CHANGE SENSOR EVERY 14 DAYS    metoPROLOL succinate (TOPROL-XL) 25 MG 24 hr tablet TAKE 1 TABLET BY MOUTH IN THE MORNING AND IN THE EVENING    diazePAM (VALIUM) 5 MG tablet TAKE 1 TABLET BY MOUTH TWICE DAILY AS NEEDED FOR ANXIETY    sildenafil (REVATIO) 20 MG tablet TAKE 5 TABLETS BY MOUTH 1 HOUR BEFORE SEXUAL ACTIVITY    Semaglutide, 2 MG/DOSE, 8 MG/3ML Solution Pen-injector Inject 2 mg into the skin every 7 days.    Continuous Blood Gluc Sensor (FreeStyle Meredith 3 Sensor) Misc CHANGE SENSOR EVERY 14 DAYS    Insulin Pen Needle (B-D U/F PEN NEEDLE 5/16\") 31G X 8 MM Misc Injects 6x per day    omega-3 acid ethyl esters (LOVAZA) 1 g capsule TAKE 2 CAPSULES BY MOUTH IN THE MORNING AND IN THE EVENING    Eliquis 5 MG Tab TAKE 1 TABLET BY MOUTH EVERY 12 HOURS    ARIPiprazole (ABILIFY) 10 MG tablet Take 1 tablet by mouth daily.    disulfiram 500 MG tablet Take 1 tablet by mouth daily. (Patient taking differently: Take 500 mg by mouth daily. Not taking due to side effects)    lithium (LITHOBID) 300 MG CR tablet 300 mg po QAM and 300 mg po QHS.    traZODone (DESYREL) 100 MG tablet Take 2-3 tablets by mouth at bedtime as needed for Sleep.    venlafaxine XR (EFFEXOR XR) 150 MG 24 hr capsule TAKE 2 CAPSULES BY MOUTH DAILY     fluticasone (FLONASE) 50 MCG/ACT nasal spray SHAKE WELL AND INSTILL 2 SPRAYS IN EACH NOSTRIL DAILY. (Patient taking differently: Spray 2 sprays in each nostril as needed (allergies).)    cholecalciferol (VITAMIN D3) 1000 UNITS tablet Take 4,000 Units by mouth.     No current facility-administered medications for this visit.       I have reviewed the allergies, medication list and past medical, family and social history sections listed in the above medical record as well as any pertinent nursing notes.      Review of Systems    Constitutional: As Per HPI.  Cardiovascular: As Per HPI.  Gastrointestinal: As Per HPI.  Endocrine: As Per HPI.  Genitourinary: As Per HPI.  All other review of systems negative, except for those noted.      OBJECTIVE:  Vitals:    12/08/23 0817   BP: 102/56   BP Location: LUE - Left upper extremity   Patient Position: Sitting   Cuff Size: Regular   Pulse: 68   Weight: 103 kg (227 lb)   Height: 6'     Body mass index is 30.79 kg/m².    Physical Exam  Constitutional: Alert, in no acute distress and current vital signs reviewed.  HEENT: Atraumatic and normocephalic. No discharge, no eyelid swelling and the sclerae were normal. Normal appearing outer ear, normal appearing nose and normal lips.  Neck: normal appearing neck and supple neck.  Respiratory: breath sounds clear to auscultation bilaterally, but no respiratory distress and normal respiratory rate and effort.  Cardiovascular: normal rate, regular rhythm, normal S1, normal S2 and edema was not present in the lower extremities.  Abdomen: soft and nontender.  Psychiatric: Alert and awake, interactive and mood/affect were appropriate.  Skin, Hair, Nails: Normal skin color and pigmentation.  Diabetic Foot Exam:  Skin integrity is normal. Dorsalis pedis and posterior tibial pulses are present.  Pressure sensation using the [place]-Logan monofilament is present. Does have callus on the medial aspect of his first metatarsal on right foot, and  planter surface of the left foot just the base of the fourth digit.     DIAGNOSTIC STUDIES:  LAB RESULTS:  Lab Services on 11/27/2023   Component Date Value Ref Range Status    TSH 11/27/2023 0.844  0.350 - 5.000 mcUnits/mL Final    Cholesterol 11/27/2023 100  <=199 mg/dL Final    Triglycerides 11/27/2023 96  <=149 mg/dL Final    HDL 11/27/2023 39 (L)  >=40 mg/dL Final    LDL 11/27/2023 42  <=129 mg/dL Final    Non-HDL Cholesterol 11/27/2023 61  mg/dL Final    Cholesterol/ HDL Ratio 11/27/2023 2.6  <=4.4 Final    Sodium 11/27/2023 142  135 - 145 mmol/L Final    Potassium 11/27/2023 4.0  3.4 - 5.1 mmol/L Final    Chloride 11/27/2023 107  97 - 110 mmol/L Final    Carbon Dioxide 11/27/2023 25  21 - 32 mmol/L Final    Anion Gap 11/27/2023 14  7 - 19 mmol/L Final    Glucose 11/27/2023 121 (H)  70 - 99 mg/dL Final    BUN 11/27/2023 16  6 - 20 mg/dL Final    Creatinine 11/27/2023 1.23 (H)  0.67 - 1.17 mg/dL Final    Glomerular Filtration Rate 11/27/2023 67  >=60 Final    BUN/Cr 11/27/2023 13  7 - 25 Final    Calcium 11/27/2023 9.5  8.4 - 10.2 mg/dL Final    Bilirubin, Total 11/27/2023 0.5  0.2 - 1.0 mg/dL Final    GOT/AST 11/27/2023 16  <=37 Units/L Final    GPT/ALT 11/27/2023 27  <64 Units/L Final    Alkaline Phosphatase 11/27/2023 33 (L)  45 - 117 Units/L Final    Albumin 11/27/2023 3.9  3.6 - 5.1 g/dL Final    Protein, Total 11/27/2023 6.6  6.4 - 8.2 g/dL Final    Globulin 11/27/2023 2.7  2.0 - 4.0 g/dL Final    A/G Ratio 11/27/2023 1.4  1.0 - 2.4 Final    Hemoglobin A1C 11/27/2023 6.3 (H)  4.5 - 5.6 % Final    Microalbumin, Urine 11/27/2023 1.46  mg/dL Final    Creatinine, Urine 11/27/2023 125.00  mg/dL Final    Microalbumin/ Creatinine Ratio 11/27/2023 11.7  <30.0 mg/g Final    Lithium 11/27/2023 0.9  0.6 - 1.2 mmol/L Final       ASSESSMENT AND PLAN:  This is a 63 year old male who presents with :  1. Type 2 diabetes mellitus without complication, with long-term current use of insulin (CMD)    2. Essential  hypertension    3. Mixed hyperlipidemia    4. Major depressive disorder, recurrent episode, moderate (CMD)    5. Obesity (BMI 30-39.9)    6. Recurrent pulmonary embolism (CMD)    7. Need for vaccination    8. Screening for colorectal cancer        Orders Placed This Encounter    OPEN ACCESS COLONOSCOPY    COVID Moderna/Spikevax 12+([id number]) - [id number]    Influenza Quadrivalent Split Pres Free 0.5 mL Pre-filled Vacc (FluLaval, Fluzone, Fluarix; ages 6+ months - [id number]    fenofibrate 160 MG tablet    atorvastatin (LIPITOR) 80 MG tablet    lisinopril (ZESTRIL) 10 MG tablet    Insulin Lispro, 1 Unit Dial, (HumaLOG KwikPen) 100 UNIT/ML pen-injector    ondansetron (ZOFRAN) 8 MG tablet       Plan:  Type 2 diabetes mellitus without complication, with long-term current use of insulin (CMD):  Significance affects from his Ozempic, he wants to go back on 1 mg seems to be doing better.   Discussed going back to 1 mg if he is having symptoms trying to use the Zofran he needs to adjust with medication.  Reviewed and discussed labs.  Refill provided for insulin Lispro 1 unit dial (Humalog kwikpen) 100 unit/mL.   Refill provided for Zofran 8 mg as needed for nausea and vomiting.   Recommended try to eat light after taking Ozempic injection.     Essential hypertension:  Blood pressure was low today since starting on Metoprolol is not having any current symptoms.   Follow up with cardiologist if not improving with that.  Refill provided for Lisniopril 10 mg.   We can adjust that if needed he wants to let us know if he has problem with that.     Mixed hyperlipidemia:  Well controlled.   Refill provided for Atorvastatin 80 mg and fenofibrate 160 mg once daily.      Major depressive disorder, recurrent episode, moderate (CMD):  Follow up with psychiatrist.      Obesity (BMI 30-39.9):  He has good weight loss with Ozempic.   Encouraged to exercise regularly and stay physically active.  Follow healthy dietary modifications to help with  weight loss.  Work on getting protein.  Follow up nutritionist.      Recurrent pulmonary embolism (CMD):  Not having issues with Ozempic.  Follow up with hematologist.      Need for vaccination:  Ordered and administered COVID MODERNA/SPIKEVAX 12+ ([id number]) and influenza vaccine.      Screening for colorectal cancer:   Ordered open access colonoscopy.     Follow up in May 2024 or sooner if needed.     I have reviewed pertinent recent labs.  Refer to orders.  Medical compliance with plan discussed and risks of non-compliance reviewed.  Patient education completed on disease process, etiology & prognosis.  Proper usage and side effects of medications reviewed & discussed.  Patient understands and agrees with the plan.  Return to clinic as clinically indicated as discussed with patient who verbalized understanding of the plan and is in agreement with the plan.    I, Belkis Rosario, have created a visit summary document based on the audio recording between Dr. Tesha Michel MD and this patient for the physician to review, edit as needed, and authenticate.  Creation Date: 12/8/2023      30-Jan-2023 11:43

## 2024-07-01 NOTE — ED ADULT NURSE NOTE - PAIN: PRESENCE, MLM
"Anesthesia Transfer of Care Note    Patient: Igor Sprague    Procedure(s) Performed: Procedure(s) (LRB):  EGD (ESOPHAGOGASTRODUODENOSCOPY) (N/A)  PH MONITORING, ESOPHAGUS, WIRELESS, (OFF REFLUX MEDS) (N/A)    Patient location: PACU    Anesthesia Type: general    Transport from OR: Transported from OR on room air with adequate spontaneous ventilation    Post pain: adequate analgesia    Post assessment: no apparent anesthetic complications and tolerated procedure well    Post vital signs: stable    Level of consciousness: awake, alert and oriented    Nausea/Vomiting: no nausea/vomiting    Complications: none    Transfer of care protocol was followed      Last vitals: Visit Vitals  BP (!) 12130   Pulse 86   Temp 37.1 °C (98.8 °F) (Tympanic)   Resp 16   Ht 5' 8" (1.727 m)   Wt 88 kg (194 lb)   SpO2 96%   BMI 29.50 kg/m²     "
complains of pain/discomfort